# Patient Record
Sex: FEMALE | Race: BLACK OR AFRICAN AMERICAN | NOT HISPANIC OR LATINO
[De-identification: names, ages, dates, MRNs, and addresses within clinical notes are randomized per-mention and may not be internally consistent; named-entity substitution may affect disease eponyms.]

---

## 2017-01-05 ENCOUNTER — APPOINTMENT (OUTPATIENT)
Dept: OBGYN | Facility: CLINIC | Age: 22
End: 2017-01-05

## 2017-01-05 VITALS
HEIGHT: 63 IN | SYSTOLIC BLOOD PRESSURE: 112 MMHG | BODY MASS INDEX: 26.05 KG/M2 | WEIGHT: 147 LBS | DIASTOLIC BLOOD PRESSURE: 78 MMHG

## 2017-01-06 ENCOUNTER — EMERGENCY (EMERGENCY)
Facility: HOSPITAL | Age: 22
LOS: 1 days | Discharge: STILL ADMITTED OR ADMINISTRATIVE | End: 2017-01-06
Admitting: EMERGENCY MEDICINE

## 2017-01-06 ENCOUNTER — OUTPATIENT (OUTPATIENT)
Dept: OUTPATIENT SERVICES | Facility: HOSPITAL | Age: 22
LOS: 1 days | End: 2017-01-06
Payer: COMMERCIAL

## 2017-01-06 VITALS
HEART RATE: 111 BPM | OXYGEN SATURATION: 100 % | DIASTOLIC BLOOD PRESSURE: 84 MMHG | RESPIRATION RATE: 16 BRPM | SYSTOLIC BLOOD PRESSURE: 116 MMHG | TEMPERATURE: 98 F

## 2017-01-06 DIAGNOSIS — O26.90 PREGNANCY RELATED CONDITIONS, UNSPECIFIED, UNSPECIFIED TRIMESTER: ICD-10-CM

## 2017-01-06 PROCEDURE — 99213 OFFICE O/P EST LOW 20 MIN: CPT | Mod: 25

## 2017-01-06 PROCEDURE — 76815 OB US LIMITED FETUS(S): CPT | Mod: 26

## 2017-01-06 NOTE — ED ADULT TRIAGE NOTE - CHIEF COMPLAINT QUOTE
pt 37 weeks pregnant, NEELA 1/29/17, having contractions every 15 min since 4am with one episode vomiting. denies rupture of membranes.   L&D LUCY Caputo called, pt to be brought directly to L&D

## 2017-01-12 ENCOUNTER — APPOINTMENT (OUTPATIENT)
Dept: OBGYN | Facility: CLINIC | Age: 22
End: 2017-01-12

## 2017-01-12 VITALS
SYSTOLIC BLOOD PRESSURE: 119 MMHG | BODY MASS INDEX: 26.78 KG/M2 | HEIGHT: 63 IN | WEIGHT: 151.13 LBS | DIASTOLIC BLOOD PRESSURE: 74 MMHG

## 2017-01-17 ENCOUNTER — APPOINTMENT (OUTPATIENT)
Dept: ANTEPARTUM | Facility: CLINIC | Age: 22
End: 2017-01-17

## 2017-01-17 ENCOUNTER — ASOB RESULT (OUTPATIENT)
Age: 22
End: 2017-01-17

## 2017-01-20 ENCOUNTER — APPOINTMENT (OUTPATIENT)
Dept: OBGYN | Facility: CLINIC | Age: 22
End: 2017-01-20

## 2017-01-20 VITALS
HEIGHT: 63 IN | SYSTOLIC BLOOD PRESSURE: 106 MMHG | WEIGHT: 150 LBS | DIASTOLIC BLOOD PRESSURE: 68 MMHG | BODY MASS INDEX: 26.58 KG/M2

## 2017-01-24 ENCOUNTER — INPATIENT (INPATIENT)
Facility: HOSPITAL | Age: 22
LOS: 1 days | Discharge: HOME | End: 2017-01-26
Attending: OBSTETRICS & GYNECOLOGY | Admitting: OBSTETRICS & GYNECOLOGY
Payer: COMMERCIAL

## 2017-01-24 VITALS — WEIGHT: 149.91 LBS | HEIGHT: 63 IN

## 2017-01-24 DIAGNOSIS — O26.90 PREGNANCY RELATED CONDITIONS, UNSPECIFIED, UNSPECIFIED TRIMESTER: ICD-10-CM

## 2017-01-24 LAB
BASOPHILS # BLD AUTO: 0.01 K/UL — SIGNIFICANT CHANGE UP (ref 0–0.2)
BASOPHILS NFR BLD AUTO: 0.1 % — SIGNIFICANT CHANGE UP (ref 0–2)
BLD GP AB SCN SERPL QL: NEGATIVE — SIGNIFICANT CHANGE UP
EOSINOPHIL # BLD AUTO: 0.1 K/UL — SIGNIFICANT CHANGE UP (ref 0–0.5)
EOSINOPHIL NFR BLD AUTO: 0.9 % — SIGNIFICANT CHANGE UP (ref 0–6)
HCT VFR BLD CALC: 30.1 % — LOW (ref 34.5–45)
HGB BLD-MCNC: 9.3 G/DL — LOW (ref 11.5–15.5)
IMM GRANULOCYTES NFR BLD AUTO: 1 % — SIGNIFICANT CHANGE UP (ref 0–1.5)
LYMPHOCYTES # BLD AUTO: 1.39 K/UL — SIGNIFICANT CHANGE UP (ref 1–3.3)
LYMPHOCYTES # BLD AUTO: 12.5 % — LOW (ref 13–44)
MCHC RBC-ENTMCNC: 23.5 PG — LOW (ref 27–34)
MCHC RBC-ENTMCNC: 30.9 % — LOW (ref 32–36)
MCV RBC AUTO: 76.2 FL — LOW (ref 80–100)
MONOCYTES # BLD AUTO: 0.66 K/UL — SIGNIFICANT CHANGE UP (ref 0–0.9)
MONOCYTES NFR BLD AUTO: 6 % — SIGNIFICANT CHANGE UP (ref 2–14)
NEUTROPHILS # BLD AUTO: 8.81 K/UL — HIGH (ref 1.8–7.4)
NEUTROPHILS NFR BLD AUTO: 79.5 % — HIGH (ref 43–77)
PLATELET # BLD AUTO: 292 K/UL — SIGNIFICANT CHANGE UP (ref 150–400)
PMV BLD: 11.6 FL — SIGNIFICANT CHANGE UP (ref 7–13)
RBC # BLD: 3.95 M/UL — SIGNIFICANT CHANGE UP (ref 3.8–5.2)
RBC # FLD: 15.3 % — HIGH (ref 10.3–14.5)
RH IG SCN BLD-IMP: POSITIVE — SIGNIFICANT CHANGE UP
RH IG SCN BLD-IMP: POSITIVE — SIGNIFICANT CHANGE UP
T PALLIDUM AB TITR SER: NEGATIVE — SIGNIFICANT CHANGE UP
WBC # BLD: 11.08 K/UL — HIGH (ref 3.8–10.5)
WBC # FLD AUTO: 11.08 K/UL — HIGH (ref 3.8–10.5)

## 2017-01-24 PROCEDURE — 59400 OBSTETRICAL CARE: CPT | Mod: GC

## 2017-01-24 RX ORDER — SODIUM CHLORIDE 9 MG/ML
3 INJECTION INTRAMUSCULAR; INTRAVENOUS; SUBCUTANEOUS EVERY 8 HOURS
Qty: 0 | Refills: 0 | Status: DISCONTINUED | OUTPATIENT
Start: 2017-01-24 | End: 2017-01-24

## 2017-01-24 RX ORDER — IBUPROFEN 200 MG
600 TABLET ORAL EVERY 6 HOURS
Qty: 0 | Refills: 0 | Status: DISCONTINUED | OUTPATIENT
Start: 2017-01-24 | End: 2017-01-26

## 2017-01-24 RX ORDER — AER TRAVELER 0.5 G/1
1 SOLUTION RECTAL; TOPICAL EVERY 4 HOURS
Qty: 0 | Refills: 0 | Status: DISCONTINUED | OUTPATIENT
Start: 2017-01-24 | End: 2017-01-24

## 2017-01-24 RX ORDER — HYDROCORTISONE 1 %
1 OINTMENT (GRAM) TOPICAL EVERY 4 HOURS
Qty: 0 | Refills: 0 | Status: DISCONTINUED | OUTPATIENT
Start: 2017-01-24 | End: 2017-01-26

## 2017-01-24 RX ORDER — KETOROLAC TROMETHAMINE 30 MG/ML
30 SYRINGE (ML) INJECTION ONCE
Qty: 0 | Refills: 0 | Status: DISCONTINUED | OUTPATIENT
Start: 2017-01-24 | End: 2017-01-24

## 2017-01-24 RX ORDER — MAGNESIUM HYDROXIDE 400 MG/1
30 TABLET, CHEWABLE ORAL
Qty: 0 | Refills: 0 | Status: DISCONTINUED | OUTPATIENT
Start: 2017-01-24 | End: 2017-01-26

## 2017-01-24 RX ORDER — ACETAMINOPHEN 500 MG
975 TABLET ORAL EVERY 6 HOURS
Qty: 0 | Refills: 0 | Status: DISCONTINUED | OUTPATIENT
Start: 2017-01-24 | End: 2017-01-25

## 2017-01-24 RX ORDER — OXYTOCIN 10 UNIT/ML
41.67 VIAL (ML) INJECTION
Qty: 20 | Refills: 0 | Status: DISCONTINUED | OUTPATIENT
Start: 2017-01-24 | End: 2017-01-24

## 2017-01-24 RX ORDER — LANOLIN
1 OINTMENT (GRAM) TOPICAL EVERY 6 HOURS
Qty: 0 | Refills: 0 | Status: DISCONTINUED | OUTPATIENT
Start: 2017-01-24 | End: 2017-01-26

## 2017-01-24 RX ORDER — CITRIC ACID/SODIUM CITRATE 300-500 MG
15 SOLUTION, ORAL ORAL EVERY 4 HOURS
Qty: 0 | Refills: 0 | Status: DISCONTINUED | OUTPATIENT
Start: 2017-01-24 | End: 2017-01-24

## 2017-01-24 RX ORDER — OXYCODONE HYDROCHLORIDE 5 MG/1
5 TABLET ORAL EVERY 4 HOURS
Qty: 0 | Refills: 0 | Status: DISCONTINUED | OUTPATIENT
Start: 2017-01-24 | End: 2017-01-26

## 2017-01-24 RX ORDER — SODIUM CHLORIDE 9 MG/ML
1000 INJECTION, SOLUTION INTRAVENOUS ONCE
Qty: 0 | Refills: 0 | Status: DISCONTINUED | OUTPATIENT
Start: 2017-01-24 | End: 2017-01-24

## 2017-01-24 RX ORDER — HYDROCORTISONE 1 %
1 OINTMENT (GRAM) TOPICAL EVERY 4 HOURS
Qty: 0 | Refills: 0 | Status: DISCONTINUED | OUTPATIENT
Start: 2017-01-24 | End: 2017-01-24

## 2017-01-24 RX ORDER — DIBUCAINE 1 %
1 OINTMENT (GRAM) RECTAL EVERY 4 HOURS
Qty: 0 | Refills: 0 | Status: DISCONTINUED | OUTPATIENT
Start: 2017-01-24 | End: 2017-01-26

## 2017-01-24 RX ORDER — PRAMOXINE HYDROCHLORIDE 150 MG/15G
1 AEROSOL, FOAM RECTAL EVERY 4 HOURS
Qty: 0 | Refills: 0 | Status: DISCONTINUED | OUTPATIENT
Start: 2017-01-24 | End: 2017-01-24

## 2017-01-24 RX ORDER — IBUPROFEN 200 MG
600 TABLET ORAL EVERY 6 HOURS
Qty: 0 | Refills: 0 | Status: DISCONTINUED | OUTPATIENT
Start: 2017-01-24 | End: 2017-01-24

## 2017-01-24 RX ORDER — DIBUCAINE 1 %
1 OINTMENT (GRAM) RECTAL EVERY 4 HOURS
Qty: 0 | Refills: 0 | Status: DISCONTINUED | OUTPATIENT
Start: 2017-01-24 | End: 2017-01-24

## 2017-01-24 RX ORDER — IBUPROFEN 200 MG
1 TABLET ORAL
Qty: 0 | Refills: 0 | DISCHARGE
Start: 2017-01-24

## 2017-01-24 RX ORDER — DIPHENHYDRAMINE HCL 50 MG
25 CAPSULE ORAL EVERY 6 HOURS
Qty: 0 | Refills: 0 | Status: DISCONTINUED | OUTPATIENT
Start: 2017-01-24 | End: 2017-01-26

## 2017-01-24 RX ORDER — OXYTOCIN 10 UNIT/ML
1 VIAL (ML) INJECTION
Qty: 30 | Refills: 0 | Status: DISCONTINUED | OUTPATIENT
Start: 2017-01-24 | End: 2017-01-24

## 2017-01-24 RX ORDER — OXYTOCIN 10 UNIT/ML
333.3 VIAL (ML) INJECTION
Qty: 20 | Refills: 0 | Status: COMPLETED | OUTPATIENT
Start: 2017-01-24 | End: 2017-01-24

## 2017-01-24 RX ORDER — PRAMOXINE HYDROCHLORIDE 150 MG/15G
1 AEROSOL, FOAM RECTAL EVERY 4 HOURS
Qty: 0 | Refills: 0 | Status: DISCONTINUED | OUTPATIENT
Start: 2017-01-24 | End: 2017-01-26

## 2017-01-24 RX ORDER — OXYTOCIN 10 UNIT/ML
333.3 VIAL (ML) INJECTION
Qty: 20 | Refills: 0 | Status: COMPLETED | OUTPATIENT
Start: 2017-01-24

## 2017-01-24 RX ORDER — SODIUM CHLORIDE 9 MG/ML
3 INJECTION INTRAMUSCULAR; INTRAVENOUS; SUBCUTANEOUS EVERY 8 HOURS
Qty: 0 | Refills: 0 | Status: DISCONTINUED | OUTPATIENT
Start: 2017-01-24 | End: 2017-01-25

## 2017-01-24 RX ORDER — DOCUSATE SODIUM 100 MG
100 CAPSULE ORAL
Qty: 0 | Refills: 0 | Status: DISCONTINUED | OUTPATIENT
Start: 2017-01-24 | End: 2017-01-26

## 2017-01-24 RX ORDER — SODIUM CHLORIDE 9 MG/ML
1000 INJECTION, SOLUTION INTRAVENOUS
Qty: 0 | Refills: 0 | Status: DISCONTINUED | OUTPATIENT
Start: 2017-01-24 | End: 2017-01-24

## 2017-01-24 RX ORDER — OXYCODONE HYDROCHLORIDE 5 MG/1
5 TABLET ORAL
Qty: 0 | Refills: 0 | Status: DISCONTINUED | OUTPATIENT
Start: 2017-01-24 | End: 2017-01-26

## 2017-01-24 RX ORDER — GLYCERIN ADULT
1 SUPPOSITORY, RECTAL RECTAL AT BEDTIME
Qty: 0 | Refills: 0 | Status: DISCONTINUED | OUTPATIENT
Start: 2017-01-24 | End: 2017-01-26

## 2017-01-24 RX ORDER — SIMETHICONE 80 MG/1
80 TABLET, CHEWABLE ORAL EVERY 6 HOURS
Qty: 0 | Refills: 0 | Status: DISCONTINUED | OUTPATIENT
Start: 2017-01-24 | End: 2017-01-26

## 2017-01-24 RX ORDER — TETANUS TOXOID, REDUCED DIPHTHERIA TOXOID AND ACELLULAR PERTUSSIS VACCINE, ADSORBED 5; 2.5; 8; 8; 2.5 [IU]/.5ML; [IU]/.5ML; UG/.5ML; UG/.5ML; UG/.5ML
0.5 SUSPENSION INTRAMUSCULAR ONCE
Qty: 0 | Refills: 0 | Status: DISCONTINUED | OUTPATIENT
Start: 2017-01-24 | End: 2017-01-26

## 2017-01-24 RX ADMIN — Medication 30 MILLIGRAM(S): at 14:49

## 2017-01-24 RX ADMIN — Medication 30 MILLIGRAM(S): at 14:18

## 2017-01-24 RX ADMIN — Medication 125 MILLIUNIT(S)/MIN: at 14:49

## 2017-01-24 RX ADMIN — Medication 999.9 MILLIUNIT(S)/MIN: at 13:48

## 2017-01-24 RX ADMIN — Medication 1 MILLIUNIT(S)/MIN: at 10:11

## 2017-01-24 NOTE — DISCHARGE NOTE OB - MEDICATION SUMMARY - MEDICATIONS TO TAKE
I will START or STAY ON the medications listed below when I get home from the hospital:    ibuprofen 600 mg oral tablet  -- 1 tab(s) by mouth every 6 hours  -- Indication: For Pregnancy related condition

## 2017-01-24 NOTE — DISCHARGE NOTE OB - PATIENT PORTAL LINK FT
“You can access the Follow[facility] Patient Portal, offered by [facility], by registering with the following website: [email or web address]”

## 2017-01-24 NOTE — DISCHARGE NOTE OB - MATERIALS PROVIDED
[facility]  Screening Program/Breastfeeding Log/Guide to Postpartum Care/[place]  Immunization Record/Breastfeeding Guide and Packet/Birth Certificate Instructions/MMR Vaccination (VIS Pub Date: 2012)/Vaccinations/Shaken Baby Prevention Handout/Breastfeeding Mother’s Support Group Information/[facility] Hearing Screen Program/Back To Sleep Handout/Tdap Vaccination (VIS Pub Date: 2012)

## 2017-01-24 NOTE — DISCHARGE NOTE OB - CARE PLAN
Principal Discharge DX:	Vaginal delivery  Goal:	routine postpartum  Instructions for follow-up, activity and diet:	pelvic rest x 6 weeks

## 2017-01-24 NOTE — DISCHARGE NOTE OB - CARE PROVIDERS DIRECT ADDRESSES
[email or web address]

## 2017-01-24 NOTE — DISCHARGE NOTE OB - MEDICATION SUMMARY - MEDICATIONS TO STOP TAKING
I will STOP taking the medications listed below when I get home from the hospital:    phenazopyridine 100 mg oral tablet  -- 1 tab(s) by mouth once a day for 2 days  -- May discolor urine or feces.  Medication should be taken with plenty of water.  Take with food or milk.    phenazopyridine 100 mg oral tablet  -- 1 tab(s) by mouth 2 times a day (after meals)  -- May discolor urine or feces.  Medication should be taken with plenty of water.  Take with food or milk.    sulfamethoxazole-trimethoprim 200 mg-40 mg/5 mL oral suspension  -- 20 milliliter(s) by mouth 2 times a day for 3 days  -- Avoid prolonged or excessive exposure to direct and/or artificial sunlight while taking this medication.  Finish all this medication unless otherwise directed by prescriber.  Medication should be taken with plenty of water.  Shake well before use.

## 2017-01-24 NOTE — DISCHARGE NOTE OB - CARE PROVIDER_API CALL
Nasima Stewart (DO), Obstetrics and Gynecology  [address] [place] [address]  Phone: [phone]  Fax: [phone]

## 2017-01-25 RX ORDER — ACETAMINOPHEN 500 MG
975 TABLET ORAL EVERY 6 HOURS
Qty: 0 | Refills: 0 | Status: DISCONTINUED | OUTPATIENT
Start: 2017-01-25 | End: 2017-01-26

## 2017-01-25 RX ADMIN — Medication 600 MILLIGRAM(S): at 18:22

## 2017-01-25 RX ADMIN — Medication 600 MILLIGRAM(S): at 01:15

## 2017-01-25 RX ADMIN — Medication 600 MILLIGRAM(S): at 00:15

## 2017-01-25 RX ADMIN — Medication 600 MILLIGRAM(S): at 18:15

## 2017-01-25 RX ADMIN — Medication 600 MILLIGRAM(S): at 12:41

## 2017-01-25 RX ADMIN — Medication 600 MILLIGRAM(S): at 12:15

## 2017-01-25 RX ADMIN — Medication 600 MILLIGRAM(S): at 07:00

## 2017-01-25 RX ADMIN — Medication 975 MILLIGRAM(S): at 23:21

## 2017-01-25 RX ADMIN — Medication 1 TABLET(S): at 12:42

## 2017-01-25 RX ADMIN — Medication 600 MILLIGRAM(S): at 06:15

## 2017-01-25 NOTE — LACTATION INITIAL EVALUATION - INTERVENTION OUTCOME
good return demonstration/Worked with mother to try to latch  and  trying to latch and not 24 hours and shown mother hand expression and colostrum noted and mother educated on feeding cues and to breastfeed 8-12 times in 24 hours and discussed and shown how to hand express and can give 5-10cc of colostrum every 2-3 hours and discussed breastfeeding log and assist prn, RN aware of plan/demonstrates understanding of teaching/verbalizes understanding

## 2017-01-26 ENCOUNTER — APPOINTMENT (OUTPATIENT)
Dept: OBGYN | Facility: CLINIC | Age: 22
End: 2017-01-26

## 2017-01-26 VITALS
TEMPERATURE: 98 F | OXYGEN SATURATION: 100 % | SYSTOLIC BLOOD PRESSURE: 109 MMHG | RESPIRATION RATE: 18 BRPM | DIASTOLIC BLOOD PRESSURE: 52 MMHG | HEART RATE: 79 BPM

## 2017-01-26 RX ADMIN — Medication 600 MILLIGRAM(S): at 08:50

## 2017-01-26 RX ADMIN — Medication 975 MILLIGRAM(S): at 06:50

## 2017-01-26 RX ADMIN — Medication 975 MILLIGRAM(S): at 05:52

## 2017-01-26 RX ADMIN — Medication 975 MILLIGRAM(S): at 00:20

## 2017-01-26 RX ADMIN — Medication 600 MILLIGRAM(S): at 02:40

## 2017-01-26 RX ADMIN — Medication 600 MILLIGRAM(S): at 01:48

## 2017-01-26 RX ADMIN — Medication 100 MILLIGRAM(S): at 08:50

## 2017-02-02 ENCOUNTER — APPOINTMENT (OUTPATIENT)
Dept: OBGYN | Facility: CLINIC | Age: 22
End: 2017-02-02

## 2017-03-08 ENCOUNTER — OTHER (OUTPATIENT)
Age: 22
End: 2017-03-08

## 2017-03-08 ENCOUNTER — APPOINTMENT (OUTPATIENT)
Dept: OBGYN | Facility: CLINIC | Age: 22
End: 2017-03-08

## 2017-03-08 VITALS
SYSTOLIC BLOOD PRESSURE: 116 MMHG | BODY MASS INDEX: 23.04 KG/M2 | HEIGHT: 63 IN | WEIGHT: 130 LBS | HEART RATE: 64 BPM | DIASTOLIC BLOOD PRESSURE: 77 MMHG

## 2017-03-08 DIAGNOSIS — R73.09 OTHER ABNORMAL GLUCOSE: ICD-10-CM

## 2017-03-08 DIAGNOSIS — Z34.01 ENCOUNTER FOR SUPERVISION OF NORMAL FIRST PREGNANCY, FIRST TRIMESTER: ICD-10-CM

## 2017-03-08 DIAGNOSIS — Z32.01 ENCOUNTER FOR PREGNANCY TEST, RESULT POSITIVE: ICD-10-CM

## 2017-03-08 DIAGNOSIS — Z34.02 ENCOUNTER FOR SUPERVISION OF NORMAL FIRST PREGNANCY, SECOND TRIMESTER: ICD-10-CM

## 2017-03-08 DIAGNOSIS — Z34.03 ENCOUNTER FOR SUPERVISION OF NORMAL FIRST PREGNANCY, THIRD TRIMESTER: ICD-10-CM

## 2017-03-09 ENCOUNTER — APPOINTMENT (OUTPATIENT)
Dept: INTERNAL MEDICINE | Facility: CLINIC | Age: 22
End: 2017-03-09

## 2017-03-09 ENCOUNTER — OTHER (OUTPATIENT)
Age: 22
End: 2017-03-09

## 2017-03-09 VITALS — DIASTOLIC BLOOD PRESSURE: 76 MMHG | TEMPERATURE: 98.9 F | HEART RATE: 80 BPM | SYSTOLIC BLOOD PRESSURE: 110 MMHG

## 2017-03-09 VITALS — WEIGHT: 128 LBS | BODY MASS INDEX: 22.68 KG/M2 | HEIGHT: 63 IN

## 2017-03-09 DIAGNOSIS — Z86.69 PERSONAL HISTORY OF OTHER DISEASES OF THE NERVOUS SYSTEM AND SENSE ORGANS: ICD-10-CM

## 2017-03-09 LAB
C TRACH RRNA SPEC QL NAA+PROBE: NORMAL
N GONORRHOEA RRNA SPEC QL NAA+PROBE: NORMAL
SOURCE AMPLIFICATION: NORMAL

## 2017-03-14 ENCOUNTER — APPOINTMENT (OUTPATIENT)
Dept: DERMATOLOGY | Facility: CLINIC | Age: 22
End: 2017-03-14

## 2017-03-31 ENCOUNTER — APPOINTMENT (OUTPATIENT)
Dept: ORTHOPEDIC SURGERY | Facility: CLINIC | Age: 22
End: 2017-03-31

## 2017-04-11 ENCOUNTER — APPOINTMENT (OUTPATIENT)
Dept: OBGYN | Facility: CLINIC | Age: 22
End: 2017-04-11

## 2017-07-18 LAB
GP B STREP DNA SPEC QL NAA+PROBE: NORMAL
GP B STREP DNA SPEC QL NAA+PROBE: NOT DETECTED
SOURCE GBS: NORMAL

## 2017-09-11 ENCOUNTER — APPOINTMENT (OUTPATIENT)
Dept: PEDIATRIC ALLERGY IMMUNOLOGY | Facility: CLINIC | Age: 22
End: 2017-09-11

## 2017-11-16 ENCOUNTER — APPOINTMENT (OUTPATIENT)
Dept: INTERNAL MEDICINE | Facility: CLINIC | Age: 22
End: 2017-11-16
Payer: COMMERCIAL

## 2017-11-16 VITALS — HEIGHT: 63 IN | BODY MASS INDEX: 24.98 KG/M2 | WEIGHT: 141 LBS

## 2017-11-16 VITALS
RESPIRATION RATE: 16 BRPM | DIASTOLIC BLOOD PRESSURE: 80 MMHG | HEART RATE: 100 BPM | SYSTOLIC BLOOD PRESSURE: 118 MMHG | TEMPERATURE: 98.9 F

## 2017-11-16 PROCEDURE — 90688 IIV4 VACCINE SPLT 0.5 ML IM: CPT

## 2017-11-16 PROCEDURE — 99213 OFFICE O/P EST LOW 20 MIN: CPT | Mod: 25

## 2017-11-16 PROCEDURE — G0008: CPT

## 2017-11-16 RX ORDER — AZITHROMYCIN 250 MG/1
250 TABLET, FILM COATED ORAL
Qty: 6 | Refills: 0 | Status: DISCONTINUED | COMMUNITY
Start: 2017-09-27

## 2017-11-17 ENCOUNTER — APPOINTMENT (OUTPATIENT)
Dept: INTERNAL MEDICINE | Facility: CLINIC | Age: 22
End: 2017-11-17

## 2017-12-17 ENCOUNTER — EMERGENCY (EMERGENCY)
Facility: HOSPITAL | Age: 22
LOS: 1 days | Discharge: HOME | End: 2017-12-17
Attending: EMERGENCY MEDICINE | Admitting: EMERGENCY MEDICINE
Payer: COMMERCIAL

## 2017-12-17 VITALS
WEIGHT: 143.08 LBS | SYSTOLIC BLOOD PRESSURE: 122 MMHG | HEART RATE: 97 BPM | DIASTOLIC BLOOD PRESSURE: 82 MMHG | TEMPERATURE: 99 F | HEIGHT: 63 IN | OXYGEN SATURATION: 100 % | RESPIRATION RATE: 16 BRPM

## 2017-12-17 PROCEDURE — 99283 EMERGENCY DEPT VISIT LOW MDM: CPT

## 2017-12-17 RX ORDER — PENICILLIN G BENZATHINE 1200000 [IU]/2ML
1.2 INJECTION, SUSPENSION INTRAMUSCULAR ONCE
Qty: 0 | Refills: 0 | Status: COMPLETED | OUTPATIENT
Start: 2017-12-17 | End: 2017-12-17

## 2017-12-17 RX ORDER — PENICILLIN G BENZATHINE 1200000 [IU]/2ML
1.2 INJECTION, SUSPENSION INTRAMUSCULAR ONCE
Qty: 0 | Refills: 0 | Status: DISCONTINUED | OUTPATIENT
Start: 2017-12-17 | End: 2017-12-17

## 2017-12-17 RX ADMIN — PENICILLIN G BENZATHINE 1.2 MILLION UNIT(S): 1200000 INJECTION, SUSPENSION INTRAMUSCULAR at 21:16

## 2017-12-17 RX ADMIN — Medication 40 MILLIGRAM(S): at 21:17

## 2017-12-17 NOTE — ED PROVIDER NOTE - OBJECTIVE STATEMENT
22F p/w cough and sore throat x1 week. cough with whitish phlegm. Patient was treated for strep throat 2 weeks ago with amoxicillin x10 days. felt better for a few days and then sore throat restarted. denies fever, chills, HA, neck pain.

## 2017-12-17 NOTE — ED PROVIDER NOTE - ATTENDING CONTRIBUTION TO CARE
agree with resident note  22 yr old female with 10 days of sore throat with productive sputum.  Was seen in C and diagnosed with strep and given amox for 10 days which made her feel better but states sore throat has returned.    PE: well appearing; VSS: afebrile; no stridor, handling secretions; no tonsillar exudates; CTAB/L    will treat empirically again for strep

## 2017-12-17 NOTE — ED ADULT TRIAGE NOTE - CHIEF COMPLAINT QUOTE
c/o unproductive cough and sore throat x 1.5 weeks. Pt was dx with strept throat 3 weeks ago , finished antibiotic regimen x 10 days.

## 2018-01-12 ENCOUNTER — APPOINTMENT (OUTPATIENT)
Dept: DERMATOLOGY | Facility: CLINIC | Age: 23
End: 2018-01-12

## 2018-01-18 ENCOUNTER — APPOINTMENT (OUTPATIENT)
Dept: OBGYN | Facility: CLINIC | Age: 23
End: 2018-01-18
Payer: COMMERCIAL

## 2018-01-18 VITALS
BODY MASS INDEX: 25.75 KG/M2 | DIASTOLIC BLOOD PRESSURE: 86 MMHG | HEIGHT: 63 IN | HEART RATE: 89 BPM | SYSTOLIC BLOOD PRESSURE: 127 MMHG | WEIGHT: 145.3 LBS

## 2018-01-18 DIAGNOSIS — Z30.09 ENCOUNTER FOR OTHER GENERAL COUNSELING AND ADVICE ON CONTRACEPTION: ICD-10-CM

## 2018-01-18 PROCEDURE — 99213 OFFICE O/P EST LOW 20 MIN: CPT

## 2018-01-19 ENCOUNTER — OTHER (OUTPATIENT)
Age: 23
End: 2018-01-19

## 2018-02-06 ENCOUNTER — APPOINTMENT (OUTPATIENT)
Dept: OTOLARYNGOLOGY | Facility: CLINIC | Age: 23
End: 2018-02-06

## 2018-03-08 ENCOUNTER — APPOINTMENT (OUTPATIENT)
Dept: OBGYN | Facility: CLINIC | Age: 23
End: 2018-03-08

## 2018-04-03 ENCOUNTER — MEDICATION RENEWAL (OUTPATIENT)
Age: 23
End: 2018-04-03

## 2018-04-14 ENCOUNTER — EMERGENCY (EMERGENCY)
Facility: HOSPITAL | Age: 23
LOS: 1 days | Discharge: HOME | End: 2018-04-14
Attending: EMERGENCY MEDICINE | Admitting: EMERGENCY MEDICINE
Payer: COMMERCIAL

## 2018-04-14 VITALS
SYSTOLIC BLOOD PRESSURE: 122 MMHG | OXYGEN SATURATION: 100 % | TEMPERATURE: 99 F | RESPIRATION RATE: 16 BRPM | DIASTOLIC BLOOD PRESSURE: 76 MMHG | HEART RATE: 81 BPM

## 2018-04-14 PROCEDURE — 73562 X-RAY EXAM OF KNEE 3: CPT | Mod: 26,LT

## 2018-04-14 PROCEDURE — 99283 EMERGENCY DEPT VISIT LOW MDM: CPT

## 2018-04-14 RX ORDER — KETOROLAC TROMETHAMINE 30 MG/ML
30 SYRINGE (ML) INJECTION ONCE
Qty: 0 | Refills: 0 | Status: DISCONTINUED | OUTPATIENT
Start: 2018-04-14 | End: 2018-04-14

## 2018-04-14 RX ADMIN — Medication 30 MILLIGRAM(S): at 11:03

## 2018-04-14 NOTE — ED PROVIDER NOTE - MEDICAL DECISION MAKING DETAILS
23y f no sig PMHx p/w L knee pain after hearing a crack on extension this morning. On exam has some increased pain on extension of her knee, and has some pain with weight bearing.  No obvious deformity of the knee, no swelling visible or palpable edema around joint. Mild decreased ROM on extension of knee; flexion normal. Ant/post drawer test (-), rui test (-). Denies trauma to the knee but states she recently moved and so has been putting a lot of weight on it recently.  Will obtain knee xray, admin pain control. Pt continues to ambulate. Will cont to reassess -Feliciano 23y f no sig PMHx p/w L knee pain after hearing a crack on extension this morning. On exam has some increased pain on extension of her knee, and has some pain with weight bearing.  No obvious deformity of the knee, no swelling visible or palpable edema around joint. Mild decreased ROM on extension of knee; flexion normal. Ant/post drawer test (-), rui test (-). Denies trauma to the knee but states she recently moved and so has been putting a lot of weight on it recently.  Will obtain knee xray, admin pain control. Pt continues to ambulate. Will cont to reassess  Xray showing mild swelling likely due to arthritis, no evidence of dislocation or fracture, will dc w/ knee brace and crutches and ortho f/u -Feliciano

## 2018-04-14 NOTE — ED PROVIDER NOTE - ATTENDING CONTRIBUTION TO CARE
Chin: 23 yof with c/o sudden onset of left knee pain after extending it today.  Pt has had achy pain for few weeks also started new working out and recently moved.  No direct trauma.  On exam, pt is well appearing no distress, +left knee small effusion, +blottable, from with tn, mild tn.  distal exam normal.  Xray, nsaids, ace wrap, elevate, rest, ice.

## 2018-04-14 NOTE — ED PROVIDER NOTE - OBJECTIVE STATEMENT
23y f no sig PMHx p/w L knee pain after hearing a crack on extension this morning. She states her L knee has been achy for the last few weeks but after this morning she has had increased pain on extension of her knee, and has some pain with weight bearing. No obvious deformity of the knee, no swelling visible or palpable edema around joint. Denies trauma to the knee but states she recently moved and so has been putting a lot of weight on it recently.

## 2018-04-14 NOTE — ED PROVIDER NOTE - MUSCULOSKELETAL MINIMAL EXAM
Mild pain on full extension of knee, no pain on flexion/no muscle tenderness/atraumatic/RANGE OF MOTION LIMITED/motor intact

## 2018-04-26 ENCOUNTER — MEDICATION RENEWAL (OUTPATIENT)
Age: 23
End: 2018-04-26

## 2018-05-10 ENCOUNTER — LABORATORY RESULT (OUTPATIENT)
Age: 23
End: 2018-05-10

## 2018-05-10 ENCOUNTER — APPOINTMENT (OUTPATIENT)
Dept: OBGYN | Facility: CLINIC | Age: 23
End: 2018-05-10
Payer: COMMERCIAL

## 2018-05-10 VITALS
DIASTOLIC BLOOD PRESSURE: 81 MMHG | BODY MASS INDEX: 24.98 KG/M2 | WEIGHT: 141 LBS | HEART RATE: 60 BPM | HEIGHT: 63 IN | SYSTOLIC BLOOD PRESSURE: 116 MMHG

## 2018-05-10 DIAGNOSIS — Z83.49 FAMILY HISTORY OF OTHER ENDOCRINE, NUTRITIONAL AND METABOLIC DISEASES: ICD-10-CM

## 2018-05-10 DIAGNOSIS — Z01.419 ENCOUNTER FOR GYNECOLOGICAL EXAMINATION (GENERAL) (ROUTINE) W/OUT ABNORMAL FINDINGS: ICD-10-CM

## 2018-05-10 DIAGNOSIS — Z83.3 FAMILY HISTORY OF DIABETES MELLITUS: ICD-10-CM

## 2018-05-10 PROCEDURE — 99395 PREV VISIT EST AGE 18-39: CPT

## 2018-05-10 RX ORDER — AMOXICILLIN 250 MG/5ML
250 POWDER, FOR SUSPENSION ORAL
Qty: 200 | Refills: 0 | Status: DISCONTINUED | COMMUNITY
Start: 2017-11-28

## 2018-05-10 RX ORDER — AMOXICILLIN 400 MG/5ML
400 FOR SUSPENSION ORAL
Qty: 200 | Refills: 0 | Status: DISCONTINUED | COMMUNITY
Start: 2018-04-24

## 2018-05-11 LAB
C TRACH RRNA SPEC QL NAA+PROBE: NOT DETECTED
N GONORRHOEA RRNA SPEC QL NAA+PROBE: NOT DETECTED
SOURCE AMPLIFICATION: NORMAL

## 2018-05-17 ENCOUNTER — OTHER (OUTPATIENT)
Age: 23
End: 2018-05-17

## 2018-05-17 LAB — CYTOLOGY CVX/VAG DOC THIN PREP: NORMAL

## 2018-06-04 ENCOUNTER — APPOINTMENT (OUTPATIENT)
Dept: INTERNAL MEDICINE | Facility: CLINIC | Age: 23
End: 2018-06-04
Payer: COMMERCIAL

## 2018-06-04 VITALS
RESPIRATION RATE: 20 BRPM | DIASTOLIC BLOOD PRESSURE: 80 MMHG | TEMPERATURE: 98.4 F | SYSTOLIC BLOOD PRESSURE: 114 MMHG | HEART RATE: 68 BPM

## 2018-06-04 VITALS — WEIGHT: 145 LBS

## 2018-06-04 PROCEDURE — 99214 OFFICE O/P EST MOD 30 MIN: CPT

## 2018-06-04 RX ORDER — FLUTICASONE PROPIONATE 50 UG/1
50 SPRAY, METERED NASAL DAILY
Qty: 1 | Refills: 1 | Status: ACTIVE | COMMUNITY
Start: 2018-06-04 | End: 1900-01-01

## 2018-06-04 NOTE — REVIEW OF SYSTEMS
[Nasal Discharge] : nasal discharge [Postnasal Drip] : postnasal drip [Negative] : Heme/Lymph [Earache] : no earache [Hearing Loss] : no hearing loss [Nosebleed] : no nosebleeds [Hoarseness] : no hoarseness [Sore Throat] : no sore throat [Skin Rash] : no skin rash

## 2018-06-04 NOTE — PHYSICAL EXAM
[No Acute Distress] : no acute distress [Well Nourished] : well nourished [Well Developed] : well developed [Well-Appearing] : well-appearing [Normal Sclera/Conjunctiva] : normal sclera/conjunctiva [PERRL] : pupils equal round and reactive to light [EOMI] : extraocular movements intact [Normal Outer Ear/Nose] : the outer ears and nose were normal in appearance [Normal Oropharynx] : the oropharynx was normal [No JVD] : no jugular venous distention [No Lymphadenopathy] : no lymphadenopathy [Thyroid Normal, No Nodules] : the thyroid was normal and there were no nodules present [No Respiratory Distress] : no respiratory distress  [Clear to Auscultation] : lungs were clear to auscultation bilaterally [No Accessory Muscle Use] : no accessory muscle use [Normal Rate] : normal rate  [Regular Rhythm] : with a regular rhythm [Normal S1, S2] : normal S1 and S2 [No Murmur] : no murmur heard [No Carotid Bruits] : no carotid bruits [No Abdominal Bruit] : a ~M bruit was not heard ~T in the abdomen [No Varicosities] : no varicosities [Pedal Pulses Present] : the pedal pulses are present [Soft] : abdomen soft [Non Tender] : non-tender [Non-distended] : non-distended [No Masses] : no abdominal mass palpated [No HSM] : no HSM [Normal Bowel Sounds] : normal bowel sounds [Normal Anterior Cervical Nodes] : no anterior cervical lymphadenopathy [No CVA Tenderness] : no CVA  tenderness [No Spinal Tenderness] : no spinal tenderness [No Joint Swelling] : no joint swelling [Grossly Normal Strength/Tone] : grossly normal strength/tone [No Rash] : no rash [Normal Gait] : normal gait [Coordination Grossly Intact] : coordination grossly intact [No Focal Deficits] : no focal deficits [Speech Grossly Normal] : speech grossly normal [Memory Grossly Normal] : memory grossly normal [Normal Affect] : the affect was normal [Alert and Oriented x3] : oriented to person, place, and time [Normal Mood] : the mood was normal [Normal Insight/Judgement] : insight and judgment were intact [de-identified] : cerumen at the ears bilat

## 2018-06-04 NOTE — HISTORY OF PRESENT ILLNESS
[FreeTextEntry1] : congestion, low back pain, dyspepsia [de-identified] : 24 y/o female with a hx of lbp, dyspepsia here for f/u and c/o congestion.  \par congestion has been at the nose and maxilla.  + occ frontal headaches.  Has been constant and for ~ 11 mo.  Worsening.  + occ clear nasal d/c - occ yellowish.  no releif with otc antihistamines and decongestants.  Reports temp relief with nasal decongestants.  no fevers.  No sx at the throat.  no noted sx at the chest.  + some tingling in the ear for the past few days-  no pain, pressure.  Occ worse with going into cold room.  Reports issues with lying down at night - needs to breathe through the mouth.  No rash or LAD.\par no other noted relieving or exacerbating factors.\par \par back has been improved.  s/p PT - doing stretches and exercises in the gym.\par \par Dyspepsia - has been controlled with diet.  \par \par Saw Gyn last mo - on chart.

## 2018-09-24 ENCOUNTER — OTHER (OUTPATIENT)
Age: 23
End: 2018-09-24

## 2018-12-01 ENCOUNTER — TRANSCRIPTION ENCOUNTER (OUTPATIENT)
Age: 23
End: 2018-12-01

## 2018-12-16 ENCOUNTER — TRANSCRIPTION ENCOUNTER (OUTPATIENT)
Age: 23
End: 2018-12-16

## 2018-12-31 ENCOUNTER — EMERGENCY (EMERGENCY)
Facility: HOSPITAL | Age: 23
LOS: 1 days | Discharge: HOME | End: 2018-12-31
Attending: EMERGENCY MEDICINE | Admitting: EMERGENCY MEDICINE
Payer: COMMERCIAL

## 2018-12-31 VITALS
HEART RATE: 72 BPM | OXYGEN SATURATION: 100 % | DIASTOLIC BLOOD PRESSURE: 61 MMHG | SYSTOLIC BLOOD PRESSURE: 120 MMHG | RESPIRATION RATE: 16 BRPM | TEMPERATURE: 98 F

## 2018-12-31 PROCEDURE — 99283 EMERGENCY DEPT VISIT LOW MDM: CPT

## 2018-12-31 RX ORDER — CIPROFLOXACIN AND DEXAMETHASONE 3; 1 MG/ML; MG/ML
4 SUSPENSION/ DROPS AURICULAR (OTIC)
Qty: 10 | Refills: 0
Start: 2018-12-31 | End: 2019-01-06

## 2018-12-31 NOTE — ED PROVIDER NOTE - MEDICAL DECISION MAKING DETAILS
Cabot: 23F with no PMH who comes in with several days of R ear pain, L trapezius pain, mild HA.  No F/C.  No trouble breathing or swallowing.  Mild erythema of the R ear canal. Will tx for You have been seen for You have been seen for otitis ext.

## 2018-12-31 NOTE — ED PROVIDER NOTE - OBJECTIVE STATEMENT
Cabot: 23F with no PMH who comes in with several days of R ear pain, L trapezius pain, mild HA.  No F/C.  No trouble breathing or swallowing.

## 2018-12-31 NOTE — ED PROVIDER NOTE - NSFOLLOWUPINSTRUCTIONS_ED_ALL_ED_FT
You have been seen for inflammation and infection of the ear canal.  To treat this, you will do the followin) Take motrin 600mg every 8 hours for pain  2) Place 4 ear drops of ciprodex in the right ear twice daily for 7 days    Come back to the ED for fever, inability to open your mouth, or other concerns..

## 2018-12-31 NOTE — ED PROVIDER NOTE - PHYSICAL EXAMINATION
HDS, NAD, MMM, eyes clear,  bilat TMs with mild cerumen, R ear canal mildly erythematous with some pain with ear traction, lungs CTAB, heart sounds normal, abd soft, NT, ND, no CVAT, LEs without edema, wwp, skin normal temperature and color, neuro: alert and oriented, no focal deficits

## 2019-04-05 ENCOUNTER — LABORATORY RESULT (OUTPATIENT)
Age: 24
End: 2019-04-05

## 2019-04-05 ENCOUNTER — APPOINTMENT (OUTPATIENT)
Dept: INTERNAL MEDICINE | Facility: CLINIC | Age: 24
End: 2019-04-05
Payer: COMMERCIAL

## 2019-04-05 VITALS
SYSTOLIC BLOOD PRESSURE: 122 MMHG | WEIGHT: 150 LBS | BODY MASS INDEX: 26.58 KG/M2 | DIASTOLIC BLOOD PRESSURE: 84 MMHG | HEIGHT: 63 IN | HEART RATE: 62 BPM

## 2019-04-05 VITALS — RESPIRATION RATE: 20 BRPM

## 2019-04-05 DIAGNOSIS — J32.9 CHRONIC SINUSITIS, UNSPECIFIED: ICD-10-CM

## 2019-04-05 DIAGNOSIS — R41.840 ATTENTION AND CONCENTRATION DEFICIT: ICD-10-CM

## 2019-04-05 PROCEDURE — 99395 PREV VISIT EST AGE 18-39: CPT | Mod: 25

## 2019-04-05 PROCEDURE — 36415 COLL VENOUS BLD VENIPUNCTURE: CPT

## 2019-04-05 PROCEDURE — 86580 TB INTRADERMAL TEST: CPT

## 2019-04-05 NOTE — HISTORY OF PRESENT ILLNESS
[FreeTextEntry1] : annual PE\par lbp, dyspepsia [de-identified] : 22 y/o female with a hx of lbp, dyspepsia, chr rhinitis\par Saw allergy for the rhinitis - had testing sent.  on rx - ? steroid.  Has f/u appt.  \par back has been improved - controlled.  s/p PT - doing stretches and exercises in the gym.\par Dyspepsia - has been controlled with diet.  \par Repotrs occ issues with concentration.  occasional.  Brother with ADHD\par On amoxil for wisdom teeth - impacted\par \par Saw Gyn 5/18\par \par needs PPD.\par Reports that she is up to date with vaccines - had with pediatrician and with employee health

## 2019-04-05 NOTE — ASSESSMENT
[FreeTextEntry1] : Discussed with the patient health care maintenance.  \par Discussed age and condition appropriate vaccinations.  \par Discussed age appropriate cancer screening testing as indicated including colon cancer screening/colonoscopy, cervical cancer screening/PAP testing, breast cancer screening/mammogram and skin cancer screening.  \par Discussed protection from the sun.  \par Discussed healthy diet, exercise and weight control for health.\par Discussed healthy habits including abstaining from smoking and drugs and abstention/moderation with alcohol.\par Discussed routine regular ophthalmology and dental follow up.\par Routine labs discussed with the patient and sent.

## 2019-04-05 NOTE — REVIEW OF SYSTEMS
[Negative] : Heme/Lymph [Itching] : no itching [Skin Rash] : no skin rash [de-identified] : concentration issues

## 2019-04-05 NOTE — HEALTH RISK ASSESSMENT
[No falls in past year] : Patient reported no falls in the past year [0] : 2) Feeling down, depressed, or hopeless: Not at all (0) [None] : None [With Significant Other] : lives with significant other [With Family] : lives with family [Employed] : employed [Student] : student [Significant Other] : lives with significant other [Sexually Active] : sexually active [Feels Safe at Home] : Feels safe at home [Fully functional (bathing, dressing, toileting, transferring, walking, feeding)] : Fully functional (bathing, dressing, toileting, transferring, walking, feeding) [Fully functional (using the telephone, shopping, preparing meals, housekeeping, doing laundry, using] : Fully functional and needs no help or supervision to perform IADLs (using the telephone, shopping, preparing meals, housekeeping, doing laundry, using transportation, managing medications and managing finances) [Smoke Detector] : smoke detector [Carbon Monoxide Detector] : carbon monoxide detector [Seat Belt] :  uses seat belt [Sunscreen] : uses sunscreen [] : No [Change in mental status noted] : No change in mental status noted [Reports changes in hearing] : Reports no changes in hearing [Reports changes in vision] : Reports no changes in vision [Reports changes in dental health] : Reports no changes in dental health [de-identified] : as noted in HPI - occ concentration lapse

## 2019-04-08 ENCOUNTER — APPOINTMENT (OUTPATIENT)
Dept: INTERNAL MEDICINE | Facility: CLINIC | Age: 24
End: 2019-04-08
Payer: COMMERCIAL

## 2019-04-08 LAB
25(OH)D3 SERPL-MCNC: 12.4 NG/ML
ALBUMIN SERPL ELPH-MCNC: 4.3 G/DL
ALP BLD-CCNC: 75 U/L
ALT SERPL-CCNC: 23 U/L
ANION GAP SERPL CALC-SCNC: 11 MMOL/L
AST SERPL-CCNC: 25 U/L
BASOPHILS # BLD AUTO: 0.06 K/UL
BASOPHILS NFR BLD AUTO: 0.7 %
BILIRUB SERPL-MCNC: <0.2 MG/DL
BUN SERPL-MCNC: 12 MG/DL
CALCIUM SERPL-MCNC: 9.3 MG/DL
CHLORIDE SERPL-SCNC: 105 MMOL/L
CHOLEST SERPL-MCNC: 170 MG/DL
CHOLEST/HDLC SERPL: 2.9 RATIO
CO2 SERPL-SCNC: 23 MMOL/L
CREAT SERPL-MCNC: 0.78 MG/DL
EOSINOPHIL # BLD AUTO: 0.77 K/UL
EOSINOPHIL NFR BLD AUTO: 9.2 %
ESTIMATED AVERAGE GLUCOSE: 117 MG/DL
GLUCOSE SERPL-MCNC: 85 MG/DL
HBA1C MFR BLD HPLC: 5.7 %
HCT VFR BLD CALC: 39.4 %
HDLC SERPL-MCNC: 58 MG/DL
HGB BLD-MCNC: 11.6 G/DL
IMM GRANULOCYTES NFR BLD AUTO: 0.2 %
IRON SATN MFR SERPL: 20 %
IRON SERPL-MCNC: 69 UG/DL
LDLC SERPL CALC-MCNC: 93 MG/DL
LYMPHOCYTES # BLD AUTO: 3.62 K/UL
LYMPHOCYTES NFR BLD AUTO: 43.4 %
MAN DIFF?: NORMAL
MCHC RBC-ENTMCNC: 26 PG
MCHC RBC-ENTMCNC: 29.4 GM/DL
MCV RBC AUTO: 88.1 FL
MONOCYTES # BLD AUTO: 0.33 K/UL
MONOCYTES NFR BLD AUTO: 4 %
NEUTROPHILS # BLD AUTO: 3.54 K/UL
NEUTROPHILS NFR BLD AUTO: 42.5 %
PLATELET # BLD AUTO: 377 K/UL
POTASSIUM SERPL-SCNC: 4.3 MMOL/L
PROT SERPL-MCNC: 7 G/DL
RBC # BLD: 4.47 M/UL
RBC # FLD: 14.1 %
SODIUM SERPL-SCNC: 139 MMOL/L
TIBC SERPL-MCNC: 349 UG/DL
TRIGL SERPL-MCNC: 96 MG/DL
TSH SERPL-ACNC: 1.33 UIU/ML
UIBC SERPL-MCNC: 280 UG/DL
WBC # FLD AUTO: 8.34 K/UL

## 2019-04-08 PROCEDURE — ZZZZZ: CPT

## 2019-04-10 ENCOUNTER — RX RENEWAL (OUTPATIENT)
Age: 24
End: 2019-04-10

## 2019-04-10 RX ORDER — MULTIVIT-MIN/FOLIC/VIT K/LYCOP 400-300MCG
25 MCG TABLET ORAL DAILY
Qty: 1 | Refills: 5 | Status: ACTIVE | COMMUNITY
Start: 1900-01-01 | End: 1900-01-01

## 2019-04-12 ENCOUNTER — TRANSCRIPTION ENCOUNTER (OUTPATIENT)
Age: 24
End: 2019-04-12

## 2019-05-21 ENCOUNTER — APPOINTMENT (OUTPATIENT)
Dept: OTOLARYNGOLOGY | Facility: CLINIC | Age: 24
End: 2019-05-21
Payer: COMMERCIAL

## 2019-05-21 VITALS
BODY MASS INDEX: 25.52 KG/M2 | HEART RATE: 86 BPM | DIASTOLIC BLOOD PRESSURE: 70 MMHG | HEIGHT: 63 IN | SYSTOLIC BLOOD PRESSURE: 109 MMHG | WEIGHT: 144 LBS

## 2019-05-21 PROCEDURE — 99213 OFFICE O/P EST LOW 20 MIN: CPT | Mod: 25

## 2019-05-21 PROCEDURE — 92504 EAR MICROSCOPY EXAMINATION: CPT

## 2019-05-21 RX ORDER — LEVONORGESTREL AND ETHINYL ESTRADIOL 0.1-0.02MG
0.1-2 KIT ORAL DAILY
Qty: 28 | Refills: 0 | Status: DISCONTINUED | COMMUNITY
Start: 2018-01-18 | End: 2019-05-21

## 2019-05-21 RX ORDER — LEVONORGESTREL AND ETHINYL ESTRADIOL 0.1-0.02MG
0.1-2 KIT ORAL DAILY
Qty: 28 | Refills: 11 | Status: DISCONTINUED | COMMUNITY
Start: 2018-05-10 | End: 2019-05-21

## 2019-05-21 RX ORDER — LEVONORGESTREL AND ETHINYL ESTRADIOL 0.1-0.02MG
0.1-2 KIT ORAL DAILY
Qty: 90 | Refills: 2 | Status: DISCONTINUED | COMMUNITY
Start: 2018-09-24 | End: 2019-05-21

## 2019-05-23 NOTE — PHYSICAL EXAM
[Binocular Microscopic Exam] : Binocular microscopic exam was performed [Hearing Loss Right Only] : normal [Hearing Loss Left Only] : normal [Rinne Test Air Conduction Persists > Bone Conduction Right] : air conduction greater than bone conduction on the right [Rinne Test Air Conduction Persists > Bone Conduction Left] : air conduction greater than bone conduction on the left [Hearing Smith Test (Tuning Fork On Forehead)] : no lateralization of tone [FreeTextEntry8] : cerumen, removed [FreeTextEntry9] : cerumen, removed [Normal] : no rashes

## 2019-05-23 NOTE — HISTORY OF PRESENT ILLNESS
[de-identified] : 24 year old woman former patient of Dr. Zelaya, here for clogged ears.  Last seen Dec. 30, 2014.  Patient denies otalgia, otorrhea, ear infections, hearing loss, tinnitus, dizziness, vertigo, headaches related to hearing.\par

## 2019-05-23 NOTE — REASON FOR VISIT
[Initial Evaluation] : an initial evaluation for [Other: _____] : [unfilled] [FreeTextEntry2] : former patient of Dr. Zelaya, here for clogged ears

## 2019-05-23 NOTE — PROCEDURE
[Cerumen Impaction] : Cerumen Impaction [Same] : same as the Pre Op Dx. [] : Removal of Cerumen [FreeTextEntry1] : ear fullness [FreeTextEntry4] : none [FreeTextEntry6] : Cerumen was removed under binocular microscopy with a combination of a suction, banegas needle, alligator and/or a loop curette. The patient tolerated the procedure well and there were no complications. The included findings were noted.\par

## 2019-05-26 ENCOUNTER — TRANSCRIPTION ENCOUNTER (OUTPATIENT)
Age: 24
End: 2019-05-26

## 2019-05-31 ENCOUNTER — EMERGENCY (EMERGENCY)
Facility: HOSPITAL | Age: 24
LOS: 1 days | Discharge: HOME | End: 2019-05-31
Attending: EMERGENCY MEDICINE | Admitting: EMERGENCY MEDICINE
Payer: COMMERCIAL

## 2019-05-31 VITALS
HEART RATE: 79 BPM | RESPIRATION RATE: 16 BRPM | TEMPERATURE: 99 F | OXYGEN SATURATION: 99 % | DIASTOLIC BLOOD PRESSURE: 87 MMHG | SYSTOLIC BLOOD PRESSURE: 125 MMHG

## 2019-05-31 PROCEDURE — 99283 EMERGENCY DEPT VISIT LOW MDM: CPT

## 2019-05-31 RX ORDER — POLYMYXIN B SULF/TRIMETHOPRIM 10000-1/ML
1 DROPS OPHTHALMIC (EYE) ONCE
Refills: 0 | Status: COMPLETED | OUTPATIENT
Start: 2019-05-31 | End: 2019-05-31

## 2019-05-31 RX ADMIN — Medication 1 DROP(S): at 10:25

## 2019-05-31 NOTE — ED PROVIDER NOTE - CHPI ED SYMPTOMS NEG
no blurred vision/no pain/no foreign body/Denies blurred vision, eye, eye pain, foreign body sensation, purulent drainage./no drainage/no purulent drainage

## 2019-05-31 NOTE — ED PROVIDER NOTE - CLINICAL SUMMARY MEDICAL DECISION MAKING FREE TEXT BOX
23 y/o F with no significant PMHx presents to the ED c/o conjunctivitis today. Plan - Will prescribe abx, discuss hand washing, and reassess.

## 2019-05-31 NOTE — ED ADULT TRIAGE NOTE - CHIEF COMPLAINT QUOTE
Pt. c/o redness and eye discharge x 2 days. States daughter has pink-eye. Has been using her drops with no relief.

## 2019-05-31 NOTE — ED PROVIDER NOTE - OBJECTIVE STATEMENT
23 y/o F with no significant PMHx presents to the ED c/o conjunctivitis today. Pt reports sick contact was her daughter. 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 Denies blurred vision, eye, eye pain, foreign body sensation, purulent drainage. No other acute complaints at time of eval.

## 2019-06-01 ENCOUNTER — TRANSCRIPTION ENCOUNTER (OUTPATIENT)
Age: 24
End: 2019-06-01

## 2019-06-23 ENCOUNTER — EMERGENCY (EMERGENCY)
Facility: HOSPITAL | Age: 24
LOS: 1 days | Discharge: HOME | End: 2019-06-23
Attending: EMERGENCY MEDICINE | Admitting: EMERGENCY MEDICINE
Payer: COMMERCIAL

## 2019-06-23 VITALS
HEART RATE: 86 BPM | DIASTOLIC BLOOD PRESSURE: 62 MMHG | OXYGEN SATURATION: 100 % | RESPIRATION RATE: 16 BRPM | HEIGHT: 63 IN | WEIGHT: 149.91 LBS | SYSTOLIC BLOOD PRESSURE: 113 MMHG | TEMPERATURE: 99 F

## 2019-06-23 LAB
ALBUMIN SERPL ELPH-MCNC: 4 G/DL — SIGNIFICANT CHANGE UP (ref 3.3–5)
ALP SERPL-CCNC: 53 U/L — SIGNIFICANT CHANGE UP (ref 40–120)
ALT FLD-CCNC: 13 U/L — SIGNIFICANT CHANGE UP (ref 4–33)
ANION GAP SERPL CALC-SCNC: 9 MMO/L — SIGNIFICANT CHANGE UP (ref 7–14)
APPEARANCE UR: CLEAR — SIGNIFICANT CHANGE UP
AST SERPL-CCNC: 14 U/L — SIGNIFICANT CHANGE UP (ref 4–32)
BACTERIA # UR AUTO: SIGNIFICANT CHANGE UP
BASOPHILS # BLD AUTO: 0.06 K/UL — SIGNIFICANT CHANGE UP (ref 0–0.2)
BASOPHILS NFR BLD AUTO: 0.5 % — SIGNIFICANT CHANGE UP (ref 0–2)
BILIRUB SERPL-MCNC: < 0.2 MG/DL — LOW (ref 0.2–1.2)
BILIRUB UR-MCNC: NEGATIVE — SIGNIFICANT CHANGE UP
BLOOD UR QL VISUAL: NEGATIVE — SIGNIFICANT CHANGE UP
BUN SERPL-MCNC: 9 MG/DL — SIGNIFICANT CHANGE UP (ref 7–23)
CALCIUM SERPL-MCNC: 9.5 MG/DL — SIGNIFICANT CHANGE UP (ref 8.4–10.5)
CHLORIDE SERPL-SCNC: 104 MMOL/L — SIGNIFICANT CHANGE UP (ref 98–107)
CO2 SERPL-SCNC: 22 MMOL/L — SIGNIFICANT CHANGE UP (ref 22–31)
COLOR SPEC: YELLOW — SIGNIFICANT CHANGE UP
CREAT SERPL-MCNC: 0.62 MG/DL — SIGNIFICANT CHANGE UP (ref 0.5–1.3)
EOSINOPHIL # BLD AUTO: 0.97 K/UL — HIGH (ref 0–0.5)
EOSINOPHIL NFR BLD AUTO: 7.9 % — HIGH (ref 0–6)
GLUCOSE SERPL-MCNC: 79 MG/DL — SIGNIFICANT CHANGE UP (ref 70–99)
GLUCOSE UR-MCNC: NEGATIVE — SIGNIFICANT CHANGE UP
HCG SERPL-ACNC: SIGNIFICANT CHANGE UP MIU/ML
HCT VFR BLD CALC: 34.4 % — LOW (ref 34.5–45)
HGB BLD-MCNC: 10.6 G/DL — LOW (ref 11.5–15.5)
IMM GRANULOCYTES NFR BLD AUTO: 0.4 % — SIGNIFICANT CHANGE UP (ref 0–1.5)
KETONES UR-MCNC: NEGATIVE — SIGNIFICANT CHANGE UP
LEUKOCYTE ESTERASE UR-ACNC: NEGATIVE — SIGNIFICANT CHANGE UP
LYMPHOCYTES # BLD AUTO: 36.6 % — SIGNIFICANT CHANGE UP (ref 13–44)
LYMPHOCYTES # BLD AUTO: 4.48 K/UL — HIGH (ref 1–3.3)
MCHC RBC-ENTMCNC: 26.3 PG — LOW (ref 27–34)
MCHC RBC-ENTMCNC: 30.8 % — LOW (ref 32–36)
MCV RBC AUTO: 85.4 FL — SIGNIFICANT CHANGE UP (ref 80–100)
MONOCYTES # BLD AUTO: 0.82 K/UL — SIGNIFICANT CHANGE UP (ref 0–0.9)
MONOCYTES NFR BLD AUTO: 6.7 % — SIGNIFICANT CHANGE UP (ref 2–14)
NEUTROPHILS # BLD AUTO: 5.87 K/UL — SIGNIFICANT CHANGE UP (ref 1.8–7.4)
NEUTROPHILS NFR BLD AUTO: 47.9 % — SIGNIFICANT CHANGE UP (ref 43–77)
NITRITE UR-MCNC: NEGATIVE — SIGNIFICANT CHANGE UP
NRBC # FLD: 0 K/UL — SIGNIFICANT CHANGE UP (ref 0–0)
PH UR: 6.5 — SIGNIFICANT CHANGE UP (ref 5–8)
PLATELET # BLD AUTO: 328 K/UL — SIGNIFICANT CHANGE UP (ref 150–400)
PMV BLD: 10.6 FL — SIGNIFICANT CHANGE UP (ref 7–13)
POTASSIUM SERPL-MCNC: 3.9 MMOL/L — SIGNIFICANT CHANGE UP (ref 3.5–5.3)
POTASSIUM SERPL-SCNC: 3.9 MMOL/L — SIGNIFICANT CHANGE UP (ref 3.5–5.3)
PROT SERPL-MCNC: 7 G/DL — SIGNIFICANT CHANGE UP (ref 6–8.3)
PROT UR-MCNC: 30 — SIGNIFICANT CHANGE UP
RBC # BLD: 4.03 M/UL — SIGNIFICANT CHANGE UP (ref 3.8–5.2)
RBC # FLD: 14.3 % — SIGNIFICANT CHANGE UP (ref 10.3–14.5)
RBC CASTS # UR COMP ASSIST: SIGNIFICANT CHANGE UP (ref 0–?)
SODIUM SERPL-SCNC: 135 MMOL/L — SIGNIFICANT CHANGE UP (ref 135–145)
SP GR SPEC: 1.03 — SIGNIFICANT CHANGE UP (ref 1–1.04)
SQUAMOUS # UR AUTO: SIGNIFICANT CHANGE UP
UROBILINOGEN FLD QL: NORMAL — SIGNIFICANT CHANGE UP
WBC # BLD: 12.25 K/UL — HIGH (ref 3.8–10.5)
WBC # FLD AUTO: 12.25 K/UL — HIGH (ref 3.8–10.5)
WBC UR QL: SIGNIFICANT CHANGE UP (ref 0–?)

## 2019-06-23 PROCEDURE — 99284 EMERGENCY DEPT VISIT MOD MDM: CPT

## 2019-06-23 RX ORDER — CEPHALEXIN 500 MG
1 CAPSULE ORAL
Qty: 20 | Refills: 0
Start: 2019-06-23 | End: 2019-07-02

## 2019-06-23 RX ORDER — SODIUM CHLORIDE 9 MG/ML
1000 INJECTION INTRAMUSCULAR; INTRAVENOUS; SUBCUTANEOUS ONCE
Refills: 0 | Status: COMPLETED | OUTPATIENT
Start: 2019-06-23 | End: 2019-06-23

## 2019-06-23 RX ORDER — CEPHALEXIN 500 MG
500 CAPSULE ORAL ONCE
Refills: 0 | Status: COMPLETED | OUTPATIENT
Start: 2019-06-23 | End: 2019-06-23

## 2019-06-23 RX ORDER — ONDANSETRON 8 MG/1
4 TABLET, FILM COATED ORAL ONCE
Refills: 0 | Status: COMPLETED | OUTPATIENT
Start: 2019-06-23 | End: 2019-06-23

## 2019-06-23 RX ADMIN — Medication 500 MILLIGRAM(S): at 16:02

## 2019-06-23 RX ADMIN — ONDANSETRON 4 MILLIGRAM(S): 8 TABLET, FILM COATED ORAL at 14:35

## 2019-06-23 RX ADMIN — SODIUM CHLORIDE 1000 MILLILITER(S): 9 INJECTION INTRAMUSCULAR; INTRAVENOUS; SUBCUTANEOUS at 14:35

## 2019-06-23 NOTE — ED PROVIDER NOTE - PROGRESS NOTE DETAILS
Urine pregnancy positive, labs within normal limits. Urine shows possible contaminant with few bacteria, will tx with keflex. Tolerating PO well, will DC with f/u OBGYN

## 2019-06-23 NOTE — ED PROVIDER NOTE - OBJECTIVE STATEMENT
25yo F with no pmh/psh presents today with nausea/vomiting/diarrhea. Diarrhea for 4 days, 3 times each day. Tolerating PO well. Diarrhea loose brown stools. no blood. Vomited twice yesterday, NBNB. No fevers, chills. No sick contacts or anyone with similar symptoms. No abdominal surgeries in the past. LMP 5/23. no vaginal bleeding. Possibility of pregnancy. denies abdominal pain, only cramping right before BM

## 2019-06-23 NOTE — ED PROVIDER NOTE - ATTENDING CONTRIBUTION TO CARE
25yo F with no pmh/psh presents today with nausea/vomiting/diarrhea. Diarrhea for 4 days, 3 times each day. Tolerating PO well. Diarrhea loose brown stools. no blood. Vomited twice yesterday, NBNB. No fevers, chills. No sick contacts or anyone with similar symptoms. No abdominal surgeries in the past. LMP 5/23. no vaginal bleeding. Possibility of pregnancy. denies abdominal pain, only cramping right before BM On exam: VSS lungs, heart, pulses, abdomen, neuro, extremities, skin, all normal on exam. Of note ucg positive. awaiting labs UA. Treated with antiemetic and fluids

## 2019-06-23 NOTE — ED PROVIDER NOTE - NSFOLLOWUPCLINICS_GEN_ALL_ED_FT
[facility] Gynecology and Obstetrics  Gynceology/OB  [address] [place] [address]  Phone: [phone]  Fax:   Follow Up Time:

## 2019-06-23 NOTE — ED PROVIDER NOTE - NSFOLLOWUPINSTRUCTIONS_ED_ALL_ED_FT
You were seen in the ER for nausea/vomiting/diarrhea.  You are 4 weeks pregnant.  Labs were within normal limits.  Take doxylamine, pyridoxine for nausea and vomiting (over the counter)  Return to ER for life threatening illnesses.  Follow up with your OBGYN

## 2019-06-23 NOTE — ED PROVIDER NOTE - CLINICAL SUMMARY MEDICAL DECISION MAKING FREE TEXT BOX
nausea/vomiting/diarrhea  without fevers - Will check labs, urine, 1L NS, zofran, PO challenge. r/o pregnancy

## 2019-07-18 ENCOUNTER — EMERGENCY (EMERGENCY)
Facility: HOSPITAL | Age: 24
LOS: 1 days | Discharge: HOME | End: 2019-07-18
Attending: EMERGENCY MEDICINE | Admitting: EMERGENCY MEDICINE
Payer: COMMERCIAL

## 2019-07-18 VITALS
SYSTOLIC BLOOD PRESSURE: 125 MMHG | DIASTOLIC BLOOD PRESSURE: 82 MMHG | TEMPERATURE: 99 F | OXYGEN SATURATION: 100 % | HEART RATE: 91 BPM | RESPIRATION RATE: 18 BRPM

## 2019-07-18 VITALS
DIASTOLIC BLOOD PRESSURE: 77 MMHG | SYSTOLIC BLOOD PRESSURE: 117 MMHG | TEMPERATURE: 98 F | HEART RATE: 81 BPM | RESPIRATION RATE: 18 BRPM | OXYGEN SATURATION: 100 %

## 2019-07-18 LAB
ALBUMIN SERPL ELPH-MCNC: 4.1 G/DL — SIGNIFICANT CHANGE UP (ref 3.3–5)
ALP SERPL-CCNC: 54 U/L — SIGNIFICANT CHANGE UP (ref 40–120)
ALT FLD-CCNC: 12 U/L — SIGNIFICANT CHANGE UP (ref 4–33)
ANION GAP SERPL CALC-SCNC: 12 MMO/L — SIGNIFICANT CHANGE UP (ref 7–14)
AST SERPL-CCNC: 23 U/L — SIGNIFICANT CHANGE UP (ref 4–32)
BASE EXCESS BLDV CALC-SCNC: -0.2 MMOL/L — SIGNIFICANT CHANGE UP
BASOPHILS # BLD AUTO: 0.05 K/UL — SIGNIFICANT CHANGE UP (ref 0–0.2)
BASOPHILS NFR BLD AUTO: 0.5 % — SIGNIFICANT CHANGE UP (ref 0–2)
BILIRUB SERPL-MCNC: 0.3 MG/DL — SIGNIFICANT CHANGE UP (ref 0.2–1.2)
BLOOD GAS VENOUS - CREATININE: 0.69 MG/DL — SIGNIFICANT CHANGE UP (ref 0.5–1.3)
BLOOD GAS VENOUS - FIO2: 21 — SIGNIFICANT CHANGE UP
BUN SERPL-MCNC: 7 MG/DL — SIGNIFICANT CHANGE UP (ref 7–23)
CALCIUM SERPL-MCNC: 9.7 MG/DL — SIGNIFICANT CHANGE UP (ref 8.4–10.5)
CHLORIDE BLDV-SCNC: 111 MMOL/L — HIGH (ref 96–108)
CHLORIDE SERPL-SCNC: 106 MMOL/L — SIGNIFICANT CHANGE UP (ref 98–107)
CO2 SERPL-SCNC: 22 MMOL/L — SIGNIFICANT CHANGE UP (ref 22–31)
CREAT SERPL-MCNC: 0.7 MG/DL — SIGNIFICANT CHANGE UP (ref 0.5–1.3)
EOSINOPHIL # BLD AUTO: 1.16 K/UL — HIGH (ref 0–0.5)
EOSINOPHIL NFR BLD AUTO: 11 % — HIGH (ref 0–6)
GAS PNL BLDV: 137 MMOL/L — SIGNIFICANT CHANGE UP (ref 136–146)
GLUCOSE BLDV-MCNC: 75 MG/DL — SIGNIFICANT CHANGE UP (ref 70–99)
GLUCOSE SERPL-MCNC: 78 MG/DL — SIGNIFICANT CHANGE UP (ref 70–99)
HCG SERPL-ACNC: 2135 MIU/ML — SIGNIFICANT CHANGE UP
HCO3 BLDV-SCNC: 23 MMOL/L — SIGNIFICANT CHANGE UP (ref 20–27)
HCT VFR BLD CALC: 36.7 % — SIGNIFICANT CHANGE UP (ref 34.5–45)
HCT VFR BLDV CALC: 35.7 % — SIGNIFICANT CHANGE UP (ref 34.5–45)
HGB BLD-MCNC: 11.3 G/DL — LOW (ref 11.5–15.5)
HGB BLDV-MCNC: 11.6 G/DL — SIGNIFICANT CHANGE UP (ref 11.5–15.5)
IMM GRANULOCYTES NFR BLD AUTO: 0.6 % — SIGNIFICANT CHANGE UP (ref 0–1.5)
LACTATE BLDV-MCNC: 1.2 MMOL/L — SIGNIFICANT CHANGE UP (ref 0.5–2)
LYMPHOCYTES # BLD AUTO: 29.8 % — SIGNIFICANT CHANGE UP (ref 13–44)
LYMPHOCYTES # BLD AUTO: 3.13 K/UL — SIGNIFICANT CHANGE UP (ref 1–3.3)
MCHC RBC-ENTMCNC: 25.9 PG — LOW (ref 27–34)
MCHC RBC-ENTMCNC: 30.8 % — LOW (ref 32–36)
MCV RBC AUTO: 84 FL — SIGNIFICANT CHANGE UP (ref 80–100)
MONOCYTES # BLD AUTO: 0.53 K/UL — SIGNIFICANT CHANGE UP (ref 0–0.9)
MONOCYTES NFR BLD AUTO: 5 % — SIGNIFICANT CHANGE UP (ref 2–14)
NEUTROPHILS # BLD AUTO: 5.59 K/UL — SIGNIFICANT CHANGE UP (ref 1.8–7.4)
NEUTROPHILS NFR BLD AUTO: 53.1 % — SIGNIFICANT CHANGE UP (ref 43–77)
NRBC # FLD: 0 K/UL — SIGNIFICANT CHANGE UP (ref 0–0)
PCO2 BLDV: 48 MMHG — SIGNIFICANT CHANGE UP (ref 41–51)
PH BLDV: 7.34 PH — SIGNIFICANT CHANGE UP (ref 7.32–7.43)
PLATELET # BLD AUTO: 349 K/UL — SIGNIFICANT CHANGE UP (ref 150–400)
PMV BLD: 10.5 FL — SIGNIFICANT CHANGE UP (ref 7–13)
PO2 BLDV: 27 MMHG — LOW (ref 35–40)
POTASSIUM BLDV-SCNC: 4 MMOL/L — SIGNIFICANT CHANGE UP (ref 3.4–4.5)
POTASSIUM SERPL-MCNC: 4.8 MMOL/L — SIGNIFICANT CHANGE UP (ref 3.5–5.3)
POTASSIUM SERPL-SCNC: 4.8 MMOL/L — SIGNIFICANT CHANGE UP (ref 3.5–5.3)
PROT SERPL-MCNC: 7.3 G/DL — SIGNIFICANT CHANGE UP (ref 6–8.3)
RBC # BLD: 4.37 M/UL — SIGNIFICANT CHANGE UP (ref 3.8–5.2)
RBC # FLD: 13.9 % — SIGNIFICANT CHANGE UP (ref 10.3–14.5)
SAO2 % BLDV: 39.3 % — LOW (ref 60–85)
SODIUM SERPL-SCNC: 140 MMOL/L — SIGNIFICANT CHANGE UP (ref 135–145)
WBC # BLD: 10.52 K/UL — HIGH (ref 3.8–10.5)
WBC # FLD AUTO: 10.52 K/UL — HIGH (ref 3.8–10.5)

## 2019-07-18 PROCEDURE — 70487 CT MAXILLOFACIAL W/DYE: CPT | Mod: 26

## 2019-07-18 PROCEDURE — 99285 EMERGENCY DEPT VISIT HI MDM: CPT

## 2019-07-18 PROCEDURE — 76830 TRANSVAGINAL US NON-OB: CPT | Mod: 26

## 2019-07-18 RX ORDER — IBUPROFEN 200 MG
1 TABLET ORAL
Qty: 28 | Refills: 0
Start: 2019-07-18 | End: 2019-07-24

## 2019-07-18 RX ORDER — ACETAMINOPHEN 500 MG
975 TABLET ORAL ONCE
Refills: 0 | Status: COMPLETED | OUTPATIENT
Start: 2019-07-18 | End: 2019-07-18

## 2019-07-18 RX ORDER — CHLORHEXIDINE GLUCONATE 213 G/1000ML
15 SOLUTION TOPICAL
Qty: 900 | Refills: 0
Start: 2019-07-18 | End: 2019-08-16

## 2019-07-18 RX ORDER — KETOROLAC TROMETHAMINE 30 MG/ML
30 SYRINGE (ML) INJECTION ONCE
Refills: 0 | Status: COMPLETED | OUTPATIENT
Start: 2019-07-18 | End: 2019-07-18

## 2019-07-18 RX ADMIN — Medication 975 MILLIGRAM(S): at 15:46

## 2019-07-18 NOTE — ED PROVIDER NOTE - PLAN OF CARE
Patient advised to follow up with PRIMARY CARE DOCTOR 1-2 DAYS AND OB/GYN WITHIN 72 HOURS AND DENTAL WITHIN WEEK and told to return to the emergency department immediately for any new or concerning symptoms such as fevers, chills, abdominal pain, nausea, vomiting, worsening bleeding  OR ANY OTHER COMPLAINTS. Patient agrees with plan.    Take medications as directed   Follow up with Gynecologist OB/GYN (referal list provided or call [phone] to make an appointment with the OB/GYN clinic)    Follow up with Dr. James and oral surgery

## 2019-07-18 NOTE — ED PROVIDER NOTE - ATTENDING CONTRIBUTION TO CARE
I performed a face to face evaluation of this patient and obtained a history and performed a full exam.  I agree with the history, physical exam and plan of the PA.  Pt with dental pain and found to have +hcg from recent D+C- downtrending and no belly pain, fever, bleeding, and well appearing.    Pt with dental pain and tooth with possibility for infection- dental consult, referred for CT scan.  Patient also on pmhx/ROS with recent D+C without pain and soft belly- for hcg and Transvaginal US as pt needs CT scan of face and patient's procedure/results not available as from outside [facility]/outpatient procedure.  Pt ambulatory and well appearing except tooth findings- labs, pain meds and evaluate for post-D+C and possible dental abscess.

## 2019-07-18 NOTE — ED PROVIDER NOTE - CARE PLAN
Principal Discharge DX:	Tooth pain  Assessment and plan of treatment:	Patient advised to follow up with PRIMARY CARE DOCTOR 1-2 DAYS AND OB/GYN WITHIN 72 HOURS AND DENTAL WITHIN WEEK and told to return to the emergency department immediately for any new or concerning symptoms such as fevers, chills, abdominal pain, nausea, vomiting, worsening bleeding  OR ANY OTHER COMPLAINTS. Patient agrees with plan.    Take medications as directed   Follow up with Gynecologist OB/GYN (referal list provided or call [phone] to make an appointment with the OB/GYN clinic)    Follow up with Dr. James and oral surgery  Secondary Diagnosis:	Retained products of conception without hemorrhage

## 2019-07-18 NOTE — ED PROVIDER NOTE - OBJECTIVE STATEMENT
HPI: Patient is a 24 y.o female with no significant PMHx who presents to ED c/o Rt lower dental pain around tooth #31 x few days. As per patient states that she was previously having Lt side lower dental pain and was following with [facility] Dentist Dr. James and was advised she needed CT for better evaluation of tooth for possible extraction. Now patient having more pain on Rt side and states "feel a lot of pressure and swelling surrounding tooth". Admits to sensitivity with eating/drinking. Patient also mentions that she recently had D&C 1 week ago at an ambulatory center in [place], denies any abdominal/vaginal complaints Denies fevers, chills, discharge, drooling, tongue elevation, dental trauma, or any other complaints.

## 2019-07-18 NOTE — PROGRESS NOTE ADULT - SUBJECTIVE AND OBJECTIVE BOX
CC: 23 y/o F presents with pain in the posterior LR quadrant with no associated swelling starting a couple days ago.    Pt seen by OMFS at [facility] for consultation for removal of wisdom teeth. Pan taken in April 2019. Plan was to obtain CBCT prior to extractions, but pt postponed due to pregnancy. Pt reports miscarriage 6 days ago via DNC. Pt now reports pain on lower left and lower right sides. Presents to ED for evaluation of increasing dental pain on right side. ED team obtained CBCT. Pt denies fever, chills, nausea, difficulty breathing/swelling.    Med HX:  Iron deficiency anemia, Vitamin D deficiency. Recent miscarriage, DNC completed.  Meds: Vitamin D, Iron supplement   Allergies: NKDA    EOE:   TMJ: FROM, pain upon lateral excursion to the right side.  Trismus (-)  LAD (-)  Dysphagia (-)  Swelling (-)    IOE: Permanent dentition. Impacted #32 partially erupted with inflamed operculum and surrounding gingival tissue, sensitive to palpation. No purulence secretion upon palpation of tissue.  Hard/Soft palate (WNL)  Tongue/Floor of Mouth (WNL)  Buccal Mucosa (WNL)  Percussion (+) #31  Palpation (+) #31  Mobility (+) + mobility noted on #31  Swelling (-)    Radiographs: None taken. Reviewed pan taken in April 2019: Erupted #1 and #16, #17 and #32 mesioangular PBI  CBCT previously requested by med team.     Assessment: . Pericoronitis on partially erupted #32    Treatment: Discussed case with OMFS resident on call. Discussed clinical and radiographic findings with patient. With verbal consent, copious irrigation with saline of operculum surrounding #32. Recommended patient make follow up appointment with [facility] provider. All questions answered.     Recommendations:   1. Rx amoxicillin, chlorhexidine rinse, and pain medication as per med team.  2. Soft diet, avoid eating on posterior right side.  3. F/u with [facility] dental clinic for extraction of 3rd molars, [phone].  4. If any difficulty breathing/swallowing or fever and swelling occur, return to ED.    Nicolas Dia DDS  #[id number]  Dyan Layne DDS  #[id number]  Main Wharton DDS  #[id number] CC: 25 y/o F presents with pain in the posterior LR quadrant with no associated swelling starting a couple days ago.    Pt seen by OMFS at [facility] for consultation for removal of wisdom teeth. Pan taken in April 2019. Plan was to obtain CBCT prior to extractions, but pt postponed due to pregnancy. Pt reports miscarriage 6 days ago via DNC. Pt now reports pain on lower left and lower right sides. Presents to ED for evaluation of increasing dental pain on right side. ED team obtained CBCT. Pt denies fever, chills, nausea, difficulty breathing/swelling.    Med HX:  Iron deficiency anemia, Vitamin D deficiency. Recent miscarriage, DNC completed.  Meds: Vitamin D, Iron supplement   Allergies: NKDA    EOE:   TMJ: FROM, pain upon lateral excursion to the right side.  Trismus (-)  LAD (-)  Dysphagia (-)  Swelling (-)    IOE: Permanent dentition. Impacted #32 partially erupted with inflamed operculum and surrounding gingival tissue, sensitive to palpation. No purulence secretion upon palpation of tissue.  Hard/Soft palate (WNL)  Tongue/Floor of Mouth (WNL)  Buccal Mucosa (WNL)  Percussion (+) #31  Palpation (+) #31  Mobility (+) slight mobility on #31  Swelling (-)    Radiographs: None taken. Reviewed pan taken in April 2019: Erupted #1 and #16, #17 and #32 mesioangular PBI  CBCT previously requested by med team.  IMPRESSION: No evidence for facial abscess    Assessment: . Pericoronitis on partially erupted #32    Treatment: Discussed case with OMFS resident on call. Discussed clinical and radiographic findings with patient. With verbal consent, copious irrigation with saline of operculum surrounding #32. Recommended patient make follow up appointment with [facility] OMFS provider. All questions answered.     Recommendations:   1. Rx amoxicillin, chlorhexidine rinse, and pain medication as per med team.  2. Soft diet, avoid eating on posterior right side.  3. F/u with [facility] dental clinic for extraction of 3rd molars, [phone].  4. If any difficulty breathing/swallowing or fever and swelling occur, return to ED.    Nicolas Dia DDS  #[id number]  Dyan Layne DDS  #[id number]  Main Wharton DDS  #[id number]

## 2019-07-18 NOTE — ED PROVIDER NOTE - PHYSICAL EXAMINATION
Vital signs reviewed.   CONSTITUTIONAL: Well-appearing; well-nourished; in no apparent distress. Non-toxic appearing.   HEAD: Normocephalic, atraumatic.  EYES: PERRL, EOM intact, conjunctiva and sclera WNL.  ENT: normal nose; no rhinorrhea; normal pharynx with no tonsillar hypertrophy, no erythema, no exudate, no lymphadenopathy. +surrounding erythema and swelling around tooth #31. Appears to be local dental abscess with +TTP noted. Also appears to be impacted wisdom tooth. Mild submandibular tenderness and swelling appreciated. No tongue elevation. No ludwigs. Pt maintaining secretions. speaking in full sentences.   NECK/LYMPH: Supple; non-tender; no cervical lymphadenopathy.  CARD: Normal S1, S2; no murmurs, rubs, or gallops noted.  RESP: Normal chest excursion with respiration; breath sounds clear and equal bilaterally; no wheezes, rhonchi, or rales.  ABD/GI: soft, non-distended; non-tender;  EXT/MS: moves all extremities;   SKIN: Normal for age and race; warm; dry; good turgor; no apparent lesions or exudate noted. No signs of cellulitis or lymphangitis noted.   NEURO: Awake, alert, oriented x 3, no gross deficits  PSYCH: Normal mood; appropriate affect.

## 2019-07-18 NOTE — ED ADULT NURSE NOTE - OBJECTIVE STATEMENT
Pt states she has bilateral jaw pain right side more painful than left 8/10  Pt A&OX4 Lungs clear Skin warm to touch no facial swelling noted.  #20 angiocath placed into [facility] labs sent awaiting UCG results for pain management. Pt awaiting CT of maxillary region

## 2019-07-18 NOTE — ED PROVIDER NOTE - NSFOLLOWUPINSTRUCTIONS_ED_ALL_ED_FT
Patient advised to follow up with PRIMARY CARE DOCTOR 1-2 DAYS AND OB/GYN WITHIN 72 HOURS AND DENTAL WITHIN WEEK and told to return to the emergency department immediately for any new or concerning symptoms such as fevers, chills, abdominal pain, nausea, vomiting, worsening bleeding  OR ANY OTHER COMPLAINTS. Patient agrees with plan.    Take medications as directed   Follow up with Gynecologist OB/GYN (referal list provided or call [phone] to make an appointment with the OB/GYN clinic)    Follow up with Dr. James and oral surgery

## 2019-07-18 NOTE — ED PROVIDER NOTE - PROGRESS NOTE DETAILS
Patients TVUS showing retained products of conception. Pt originally denied any vaginal bleeding but now states that she has had bleeding, but was told that is normal d&c. Admits bleeding subsiding. Denies abdominal pain/n/v. Will consult OB/GYN team. Pt pending CT results. Call placed to Dental regarding dental abscess and impacted wisdom tooth. CT scan performed but results pending. Dental states they will evaluate. Spoke to GYN resident, states will review case with attending and call back. Spoke to GYN- State that patient stable to f/u outpatient. Also spoke to dental states they discussed case with OMFS and they recommend patient be dc on abx, peridex and pain control and patient to f/u outpatient with them. Pt aware of plan. Aware needs to f/u with OB/GYN outpatient as soon as possible and strict return precautions given. All question and concerns addressed. Strict return instructions given.

## 2019-07-18 NOTE — ED PROVIDER NOTE - CLINICAL SUMMARY MEDICAL DECISION MAKING FREE TEXT BOX
This pt is scheduled for a colonoscopy on 4/4/18    Can she hold her Plavix for 3-5 days prior to procedure?   Pt with dental pain and tooth with possibility for infection- dental consult, referred for CT scan.  Patient also on pmhx/ROS with recent D+C without pain and soft belly- for hcg and Transvaginal US as pt needs CT scan of face and patient's procedure/results not available as from outside [facility]/outpatient procedure.  Pt ambulatory and well appearing except tooth findings- labs, pain meds and evaluate for post-D+C and possible dental abscess.

## 2019-07-18 NOTE — ED PROVIDER NOTE - CARE PROVIDER_API CALL
Brenton James (DDS; MD)  OralMaxillofacial Surgery  [id number] [place] [address]  Phone: [phone]  Fax: [phone]  Follow Up Time: 1-3 Days

## 2019-08-21 ENCOUNTER — APPOINTMENT (OUTPATIENT)
Dept: OTOLARYNGOLOGY | Facility: CLINIC | Age: 24
End: 2019-08-21
Payer: COMMERCIAL

## 2019-08-21 VITALS
HEIGHT: 63 IN | SYSTOLIC BLOOD PRESSURE: 112 MMHG | HEART RATE: 55 BPM | BODY MASS INDEX: 24.8 KG/M2 | DIASTOLIC BLOOD PRESSURE: 79 MMHG | WEIGHT: 140 LBS

## 2019-08-21 DIAGNOSIS — J34.89 OTHER SPECIFIED DISORDERS OF NOSE AND NASAL SINUSES: ICD-10-CM

## 2019-08-21 DIAGNOSIS — R09.81 NASAL CONGESTION: ICD-10-CM

## 2019-08-21 PROCEDURE — 31231 NASAL ENDOSCOPY DX: CPT

## 2019-08-21 PROCEDURE — 99214 OFFICE O/P EST MOD 30 MIN: CPT | Mod: 25

## 2019-08-21 RX ORDER — AZELASTINE HYDROCHLORIDE 137 UG/1
137 SPRAY, METERED NASAL TWICE DAILY
Qty: 1 | Refills: 5 | Status: ACTIVE | COMMUNITY
Start: 2019-08-21 | End: 1900-01-01

## 2019-08-21 RX ORDER — MOMETASONE 50 UG/1
50 SPRAY, METERED NASAL TWICE DAILY
Qty: 1 | Refills: 5 | Status: ACTIVE | COMMUNITY
Start: 2019-08-21 | End: 1900-01-01

## 2019-08-23 NOTE — ASSESSMENT
[FreeTextEntry1] : nasal congestion with ITH:\par - start nasonex and astelin BID\par - f/u 4-6 weeks and if symptoms persist can consider turbinate reduction

## 2019-08-23 NOTE — PROCEDURE
[FreeTextEntry6] : Afrin and lidocaine were topically sprayed. Flexible scope #2 was used. Right nasal passage with hypertrophied inferior, middle and superior turbinates. Nasal passage patent with clear middle meatus and sphenoethmoid recess. Left nasal passage with hypertrophied inferior, middle and superior turbinates. Nasal passage was patent with clear middle meatus and sphenoethmoid recess. No mucopurulence or polyps appreciated. Nasopharynx clear.

## 2019-08-23 NOTE — PHYSICAL EXAM
[Normal] : mucosa is normal [Midline] : trachea located in midline position [de-identified] : boggy edematous mucosa

## 2019-08-23 NOTE — HISTORY OF PRESENT ILLNESS
[de-identified] : 25 y/o F with chronic nasal congestion b/l for the past 2 yeas, symptoms started following pregnancy.  Pos sinus pain and pressure with headache.  No sinus infections.  Reports having been seen by 4 allergists and notes has multiple allergies.  Currently using Zyrtec, sinus wash and Flonase without improvement.   Has used PO steroids in the past with improvement while taking them, however symptoms returned once off.

## 2019-09-24 ENCOUNTER — APPOINTMENT (OUTPATIENT)
Dept: PSYCHIATRY | Facility: CLINIC | Age: 24
End: 2019-09-24
Payer: COMMERCIAL

## 2019-09-24 PROCEDURE — 99205 OFFICE O/P NEW HI 60 MIN: CPT

## 2019-09-27 ENCOUNTER — APPOINTMENT (OUTPATIENT)
Dept: OTOLARYNGOLOGY | Facility: CLINIC | Age: 24
End: 2019-09-27

## 2019-10-02 ENCOUNTER — OTHER (OUTPATIENT)
Age: 24
End: 2019-10-02

## 2019-10-02 ENCOUNTER — APPOINTMENT (OUTPATIENT)
Dept: OBGYN | Facility: CLINIC | Age: 24
End: 2019-10-02
Payer: COMMERCIAL

## 2019-10-02 VITALS
WEIGHT: 132 LBS | SYSTOLIC BLOOD PRESSURE: 114 MMHG | DIASTOLIC BLOOD PRESSURE: 78 MMHG | HEART RATE: 60 BPM | BODY MASS INDEX: 23.39 KG/M2 | HEIGHT: 63 IN

## 2019-10-02 DIAGNOSIS — Z63.9 PROBLEM RELATED TO PRIMARY SUPPORT GROUP, UNSPECIFIED: ICD-10-CM

## 2019-10-02 DIAGNOSIS — N92.6 IRREGULAR MENSTRUATION, UNSPECIFIED: ICD-10-CM

## 2019-10-02 DIAGNOSIS — H61.23 IMPACTED CERUMEN, BILATERAL: ICD-10-CM

## 2019-10-02 PROCEDURE — 99395 PREV VISIT EST AGE 18-39: CPT

## 2019-10-02 PROCEDURE — 99213 OFFICE O/P EST LOW 20 MIN: CPT | Mod: 25

## 2019-10-02 SDOH — SOCIAL STABILITY - SOCIAL INSECURITY: PROBLEM RELATED TO PRIMARY SUPPORT GROUP, UNSPECIFIED: Z63.9

## 2019-10-02 NOTE — PHYSICAL EXAM
[Awake] : awake [Alert] : alert [Mass] : no breast mass [Acute Distress] : no acute distress [Nipple Discharge] : no nipple discharge [Axillary LAD] : no axillary lymphadenopathy [Soft] : soft [Tender] : non tender [Distended] : not distended [Oriented x3] : oriented to person, place, and time [Depressed Mood] : not depressed [Flat Affect] : affect not flat [No Lesions] : no genitalia lesions [Normal] : cervix [Labia Majora] : labia major [Labia Minora] : labia minora [Discharge] : a  ~M vaginal discharge was present [Moderate] : moderate [White] : white [Mucoid] : mucoid [No Bleeding] : there was no active vaginal bleeding [Pap Obtained] : a Pap smear was performed [Motion Tenderness] : there was no cervical motion tenderness [Tenderness] : nontender [Adnexa Tenderness] : were not tender

## 2019-10-02 NOTE — HISTORY OF PRESENT ILLNESS
[___ Year(s) Ago] : [unfilled] year(s) ago [Last Pap ___] : Last cervical pap smear was [unfilled] [Good] : being in good health [Healthy Diet] : a healthy diet [Regular Exercise] : regular exercise [Weight Concerns] : no concerns with her weight

## 2019-10-03 ENCOUNTER — EMERGENCY (EMERGENCY)
Facility: HOSPITAL | Age: 24
LOS: 1 days | Discharge: HOME | End: 2019-10-03
Attending: EMERGENCY MEDICINE | Admitting: EMERGENCY MEDICINE
Payer: COMMERCIAL

## 2019-10-03 VITALS
SYSTOLIC BLOOD PRESSURE: 124 MMHG | OXYGEN SATURATION: 99 % | HEART RATE: 74 BPM | DIASTOLIC BLOOD PRESSURE: 74 MMHG | RESPIRATION RATE: 16 BRPM | TEMPERATURE: 98 F

## 2019-10-03 PROCEDURE — 99283 EMERGENCY DEPT VISIT LOW MDM: CPT

## 2019-10-03 RX ORDER — ACETAMINOPHEN 500 MG
975 TABLET ORAL ONCE
Refills: 0 | Status: COMPLETED | OUTPATIENT
Start: 2019-10-03 | End: 2019-10-03

## 2019-10-03 RX ORDER — IBUPROFEN 200 MG
600 TABLET ORAL ONCE
Refills: 0 | Status: COMPLETED | OUTPATIENT
Start: 2019-10-03 | End: 2019-10-03

## 2019-10-03 RX ADMIN — Medication 975 MILLIGRAM(S): at 16:41

## 2019-10-03 RX ADMIN — Medication 600 MILLIGRAM(S): at 16:41

## 2019-10-03 NOTE — ED PROVIDER NOTE - PATIENT PORTAL LINK FT
You can access the FollowMyHealth Patient Portal offered by [facility] by registering at the following website: [email or web address]. By joining [employer]’s FollowMyHealth portal, you will also be able to view your health information using other applications (apps) compatible with our system.
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
## 2019-10-03 NOTE — ED PROVIDER NOTE - PROGRESS NOTE DETAILS
Joseph Frankel PGY1: Patient feeling better after medication. Will dc with follow up. Discussed plan and return precautions with patient and family who understand and agree. Joseph Frankel PGY1: Patient states she is not pregnant. Patient informed of risks of NSAIDs if she is pregnant and denies further testing to assess whether she is pregnant. Will give NSAID.
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
## 2019-10-03 NOTE — ED PROVIDER NOTE - PHYSICAL EXAMINATION
GENERAL: Patient lying in bed comfortably. In no acute distress. Answering questions appropriately.   HEENT: NC, Small contusion on forehead, PERRL, EOMI b/l, conjunctiva noninjected and anicteric,  moist mucous membranes  NECK: Paravertebral tenderness on right side of neck, trachea midline, Able to range neck left and right without pain.   LUNG: CTAB, no w/r/r appreciated, good respiratory effort  CV: RRR, no m/r/g appreciated  ABDOMEN: Soft, NTND, no rebound or guarding, no appreciable organomegaly  MSK: Right sided back tenderness. No midline tenderness. No edema, no visible deformities, full range of motion UE/LE b/l, 5/5 muscle strength UE/LE b/l  NEURO: AAOx4 (to person, place, time, event), CN II-XII grossly intact, sensation grossly intact, finger-to-nose smooth and rapid, no pronator drift, steady gait,  -Cranial Nerves:  --CN II: PERRLA  --CN III, IV, VI: EOMI b/l  --CN V1-3: Facial sensation intact to touch  --CN VII: No facial asymmetry or droop  --CN VIII: Hearing intact to rubbing fingers b/l  --CN IX, X: Palate elevates symmetrically. Normal phonation  --CN XI: Heading turning and shoulder shrug intact b/l  --CN XII: Tongue midline with normal movements  SKIN: Warm, dry, well perfused, no evidence of rash  PSYCH: Normal mood and affect

## 2019-10-03 NOTE — ED PROVIDER NOTE - NS ED ROS FT
Gen: Denies fever, weight loss  CV: Denies chest pain, palpitations  HEENT: Denies neck pain, sore throat, eye discharge  Skin: Denies rash, erythema, color changes  Resp: Denies SOB, cough  Endo: Denies sensitivity to heat, cold, increased urination  GI: Denies constipation, nausea, vomiting  Msk: + back pain, denies LE swelling, extremity pain  : Denies dysuria, increased frequency  Neuro: Denies LOC, weakness, seizures  Psych: Denies hx of psych, hallucinations, SI

## 2019-10-08 ENCOUNTER — APPOINTMENT (OUTPATIENT)
Dept: OBGYN | Facility: CLINIC | Age: 24
End: 2019-10-08
Payer: COMMERCIAL

## 2019-10-08 ENCOUNTER — ASOB RESULT (OUTPATIENT)
Age: 24
End: 2019-10-08

## 2019-10-08 PROCEDURE — 76830 TRANSVAGINAL US NON-OB: CPT

## 2019-10-09 ENCOUNTER — OTHER (OUTPATIENT)
Age: 24
End: 2019-10-09

## 2019-10-09 RX ORDER — ETONOGESTREL AND ETHINYL ESTRADIOL .12; .015 MG/D; MG/D
0.12-0.015 INSERT, EXTENDED RELEASE VAGINAL
Qty: 3 | Refills: 3 | Status: ACTIVE | COMMUNITY
Start: 2019-10-09 | End: 1900-01-01

## 2019-10-15 ENCOUNTER — OTHER (OUTPATIENT)
Age: 24
End: 2019-10-15

## 2019-10-15 LAB
C TRACH RRNA SPEC QL NAA+PROBE: NOT DETECTED
CANDIDA VAG CYTO: NOT DETECTED
CYTOLOGY CVX/VAG DOC THIN PREP: NORMAL
G VAGINALIS+PREV SP MTYP VAG QL MICRO: NOT DETECTED
HCG SERPL-MCNC: 1 MIU/ML
N GONORRHOEA RRNA SPEC QL NAA+PROBE: NOT DETECTED
SOURCE AMPLIFICATION: NORMAL
T VAGINALIS VAG QL WET PREP: NOT DETECTED
TSH SERPL-ACNC: 2.17 UIU/ML

## 2019-10-22 ENCOUNTER — APPOINTMENT (OUTPATIENT)
Dept: PSYCHIATRY | Facility: CLINIC | Age: 24
End: 2019-10-22
Payer: COMMERCIAL

## 2019-10-22 PROCEDURE — 99214 OFFICE O/P EST MOD 30 MIN: CPT

## 2019-10-24 ENCOUNTER — TRANSCRIPTION ENCOUNTER (OUTPATIENT)
Age: 24
End: 2019-10-24

## 2020-01-01 ENCOUNTER — TRANSCRIPTION ENCOUNTER (OUTPATIENT)
Age: 25
End: 2020-01-01

## 2020-01-04 ENCOUNTER — EMERGENCY (EMERGENCY)
Facility: HOSPITAL | Age: 25
LOS: 1 days | Discharge: HOME | End: 2020-01-04
Attending: EMERGENCY MEDICINE | Admitting: EMERGENCY MEDICINE
Payer: COMMERCIAL

## 2020-01-04 VITALS
HEART RATE: 90 BPM | SYSTOLIC BLOOD PRESSURE: 130 MMHG | OXYGEN SATURATION: 100 % | DIASTOLIC BLOOD PRESSURE: 73 MMHG | RESPIRATION RATE: 17 BRPM | TEMPERATURE: 98 F

## 2020-01-04 LAB
ALBUMIN SERPL ELPH-MCNC: 4.4 G/DL — SIGNIFICANT CHANGE UP (ref 3.3–5)
ALP SERPL-CCNC: 60 U/L — SIGNIFICANT CHANGE UP (ref 40–120)
ALT FLD-CCNC: 12 U/L — SIGNIFICANT CHANGE UP (ref 4–33)
ANION GAP SERPL CALC-SCNC: 13 MMO/L — SIGNIFICANT CHANGE UP (ref 7–14)
AST SERPL-CCNC: 18 U/L — SIGNIFICANT CHANGE UP (ref 4–32)
BILIRUB SERPL-MCNC: < 0.2 MG/DL — LOW (ref 0.2–1.2)
BUN SERPL-MCNC: 9 MG/DL — SIGNIFICANT CHANGE UP (ref 7–23)
CALCIUM SERPL-MCNC: 9.8 MG/DL — SIGNIFICANT CHANGE UP (ref 8.4–10.5)
CHLORIDE SERPL-SCNC: 103 MMOL/L — SIGNIFICANT CHANGE UP (ref 98–107)
CO2 SERPL-SCNC: 20 MMOL/L — LOW (ref 22–31)
CREAT SERPL-MCNC: 0.68 MG/DL — SIGNIFICANT CHANGE UP (ref 0.5–1.3)
GLUCOSE SERPL-MCNC: 89 MG/DL — SIGNIFICANT CHANGE UP (ref 70–99)
POTASSIUM SERPL-MCNC: 4 MMOL/L — SIGNIFICANT CHANGE UP (ref 3.5–5.3)
POTASSIUM SERPL-SCNC: 4 MMOL/L — SIGNIFICANT CHANGE UP (ref 3.5–5.3)
PROT SERPL-MCNC: 7.5 G/DL — SIGNIFICANT CHANGE UP (ref 6–8.3)
SODIUM SERPL-SCNC: 136 MMOL/L — SIGNIFICANT CHANGE UP (ref 135–145)

## 2020-01-04 PROCEDURE — 99283 EMERGENCY DEPT VISIT LOW MDM: CPT

## 2020-01-04 RX ORDER — ULTRAMICROSIZE GRISEOFULVIN 250 MG/1
2 TABLET ORAL
Qty: 88 | Refills: 0
Start: 2020-01-04 | End: 2020-02-16

## 2020-01-04 NOTE — ED PROVIDER NOTE - PATIENT PORTAL LINK FT
You can access the FollowMyHealth Patient Portal offered by [facility] by registering at the following website: [email or web address]. By joining [employer]’s FollowMyHealth portal, you will also be able to view your health information using other applications (apps) compatible with our system.

## 2020-01-04 NOTE — ED PROVIDER NOTE - SKIN, MLM
Skin normal color for race, warm, dry and intact.. +rash on skin with flakes. non ttp, no induration.

## 2020-01-04 NOTE — ED PROVIDER NOTE - CLINICAL SUMMARY MEDICAL DECISION MAKING FREE TEXT BOX
pt p/w tinea capitus, caught from daughter, requesting griseofulvin. will access lfts, upreg and d/c. with derm f/u.

## 2020-01-04 NOTE — ED PROVIDER NOTE - OBJECTIVE STATEMENT
23 y/o with no pmhx p/w signs of tinea capitus. exposure via daughter, started on griseofulvin, pruritus and small spot of skin pallor. no systemic symptoms. sexually active with no active pregnancy at this point.

## 2020-01-08 ENCOUNTER — TRANSCRIPTION ENCOUNTER (OUTPATIENT)
Age: 25
End: 2020-01-08

## 2020-01-17 ENCOUNTER — APPOINTMENT (OUTPATIENT)
Dept: PSYCHIATRY | Facility: CLINIC | Age: 25
End: 2020-01-17

## 2020-03-30 ENCOUNTER — APPOINTMENT (OUTPATIENT)
Dept: OBGYN | Facility: CLINIC | Age: 25
End: 2020-03-30
Payer: COMMERCIAL

## 2020-03-30 ENCOUNTER — APPOINTMENT (OUTPATIENT)
Dept: OBGYN | Facility: CLINIC | Age: 25
End: 2020-03-30

## 2020-03-30 PROCEDURE — 76815 OB US LIMITED FETUS(S): CPT

## 2020-03-30 PROCEDURE — 36415 COLL VENOUS BLD VENIPUNCTURE: CPT

## 2020-03-30 PROCEDURE — 0500F INITIAL PRENATAL CARE VISIT: CPT

## 2020-04-15 ENCOUNTER — ASOB RESULT (OUTPATIENT)
Age: 25
End: 2020-04-15

## 2020-04-15 ENCOUNTER — APPOINTMENT (OUTPATIENT)
Dept: ANTEPARTUM | Facility: CLINIC | Age: 25
End: 2020-04-15
Payer: COMMERCIAL

## 2020-04-15 PROCEDURE — 76811 OB US DETAILED SNGL FETUS: CPT

## 2020-04-21 ENCOUNTER — APPOINTMENT (OUTPATIENT)
Dept: MATERNAL FETAL MEDICINE | Facility: CLINIC | Age: 25
End: 2020-04-21
Payer: COMMERCIAL

## 2020-04-21 ENCOUNTER — APPOINTMENT (OUTPATIENT)
Dept: ANTEPARTUM | Facility: CLINIC | Age: 25
End: 2020-04-21
Payer: COMMERCIAL

## 2020-04-21 ENCOUNTER — ASOB RESULT (OUTPATIENT)
Age: 25
End: 2020-04-21

## 2020-04-21 PROCEDURE — 99201 OFFICE OUTPATIENT NEW 10 MINUTES: CPT | Mod: 25

## 2020-04-21 PROCEDURE — 76816 OB US FOLLOW-UP PER FETUS: CPT

## 2020-04-24 ENCOUNTER — ASOB RESULT (OUTPATIENT)
Age: 25
End: 2020-04-24

## 2020-04-24 ENCOUNTER — APPOINTMENT (OUTPATIENT)
Dept: ANTEPARTUM | Facility: CLINIC | Age: 25
End: 2020-04-24

## 2020-04-25 ENCOUNTER — LABORATORY RESULT (OUTPATIENT)
Age: 25
End: 2020-04-25

## 2020-04-25 ENCOUNTER — MESSAGE (OUTPATIENT)
Age: 25
End: 2020-04-25

## 2020-05-03 ENCOUNTER — APPOINTMENT (OUTPATIENT)
Dept: DISASTER EMERGENCY | Facility: HOSPITAL | Age: 25
End: 2020-05-03

## 2020-05-04 LAB
SARS-COV-2 IGG SERPL IA-ACNC: 5.6 AU/ML
SARS-COV-2 IGG SERPL QL IA: NEGATIVE

## 2020-05-07 ENCOUNTER — APPOINTMENT (OUTPATIENT)
Dept: OBGYN | Facility: CLINIC | Age: 25
End: 2020-05-07
Payer: COMMERCIAL

## 2020-05-07 PROCEDURE — 0502F SUBSEQUENT PRENATAL CARE: CPT

## 2020-05-07 PROCEDURE — 59425 ANTEPARTUM CARE ONLY: CPT

## 2020-05-07 PROCEDURE — 36415 COLL VENOUS BLD VENIPUNCTURE: CPT

## 2020-05-22 ENCOUNTER — EMERGENCY (EMERGENCY)
Facility: HOSPITAL | Age: 25
LOS: 1 days | Discharge: HOME | End: 2020-05-22
Attending: EMERGENCY MEDICINE | Admitting: EMERGENCY MEDICINE
Payer: COMMERCIAL

## 2020-05-22 VITALS
SYSTOLIC BLOOD PRESSURE: 123 MMHG | DIASTOLIC BLOOD PRESSURE: 70 MMHG | OXYGEN SATURATION: 100 % | RESPIRATION RATE: 16 BRPM | HEART RATE: 83 BPM | TEMPERATURE: 99 F

## 2020-05-22 PROCEDURE — 99283 EMERGENCY DEPT VISIT LOW MDM: CPT

## 2020-05-22 RX ORDER — LIDOCAINE 4 G/100G
1 CREAM TOPICAL ONCE
Refills: 0 | Status: DISCONTINUED | OUTPATIENT
Start: 2020-05-22 | End: 2020-05-22

## 2020-05-22 RX ORDER — PENICILLIN V POTASSIUM 250 MG
1 TABLET ORAL
Qty: 10 | Refills: 0
Start: 2020-05-22

## 2020-05-22 RX ORDER — LIDOCAINE 4 G/100G
15 CREAM TOPICAL THREE TIMES A DAY
Refills: 0 | Status: DISCONTINUED | OUTPATIENT
Start: 2020-05-22 | End: 2020-05-26

## 2020-05-22 NOTE — ED PROVIDER NOTE - PATIENT PORTAL LINK FT
You can access the FollowMyHealth Patient Portal offered by [facility] by registering at the following website: [email or web address]. By joining [employer]’s FollowMyHealth portal, you will also be able to view your health information using other applications (apps) compatible with our system.

## 2020-05-22 NOTE — ED PROVIDER NOTE - CLINICAL SUMMARY MEDICAL DECISION MAKING FREE TEXT BOX
26 y/o F approx 27 week pregnant with no pertinent PMHx presents to ED with tooth pain in the number 17 tooth that was onset a week prior to visit. Plan to give penicillin, control pain local and systemically. Will advise follow up with dentist.

## 2020-05-22 NOTE — ED PROVIDER NOTE - TOOTH FINDINGS
normal shaped tooth, normal jaw movements, no pus, irritation on gums, tongue and base of mouth are nontender

## 2020-05-22 NOTE — ED PROVIDER NOTE - OBJECTIVE STATEMENT
26 y/o F approx 27 week pregnant with no pertinent PMHx presents to ED with tooth pain in the number 17 tooth that was onset a week prior to visit. Pt knows pain is related to an impacted wisdom tooth, but couldn't get it taken care of due to insurance reasons. She rates the pain 7/10 severity. Associated symptoms that the patient reports are swelling and irritation to the gums. Pt denies radiation of pain and fever. Pt is still able to eat food.

## 2020-05-22 NOTE — ED ADULT NURSE NOTE - OBJECTIVE STATEMENT
Receive pt. in Intake room 13 alert and oriented x 4, presenting to the ER with complaints of left wisdom tooth. Pt. is 27 weeks pregnant and stated " my wisdom tooth has been hurting I was told it was impacted few months ago, I receive antibiotics the pain went away but now it is hurting again". Pt. has no difficulty eating or swallowing, will continue to monitor.

## 2020-06-04 ENCOUNTER — APPOINTMENT (OUTPATIENT)
Dept: OBGYN | Facility: CLINIC | Age: 25
End: 2020-06-04
Payer: COMMERCIAL

## 2020-06-04 PROCEDURE — 0502F SUBSEQUENT PRENATAL CARE: CPT

## 2020-06-06 ENCOUNTER — TRANSCRIPTION ENCOUNTER (OUTPATIENT)
Age: 25
End: 2020-06-06

## 2020-06-27 NOTE — LACTATION INITIAL EVALUATION - LATCH: TYPE OF NIPPLE INFANT
Plan of care cont    Problem: Potential for Falls  Goal: Patient will remain free of falls  Description  INTERVENTIONS:  - Assess patient frequently for physical needs  -  Identify cognitive and physical deficits and behaviors that affect risk of falls    -  [place] fall precautions as indicated by assessment   - Educate patient/family on patient safety including physical limitations  - Instruct patient to call for assistance with activity based on assessment  - Modify environment to reduce risk of injury  - Consider OT/PT consult to assist with strengthening/mobility  Outcome: Progressing     Problem: PAIN - ADULT  Goal: Verbalizes/displays adequate comfort level or baseline comfort level  Description  Interventions:  - Encourage patient to monitor pain and request assistance  - Assess pain using appropriate pain scale  - Administer analgesics based on type and severity of pain and evaluate response  - Implement non-pharmacological measures as appropriate and evaluate response  - Consider cultural and social influences on pain and pain management  - Notify physician/advanced practitioner if interventions unsuccessful or patient reports new pain  Outcome: Progressing     Problem: INFECTION - ADULT  Goal: Absence or prevention of progression during hospitalization  Description  INTERVENTIONS:  - Assess and monitor for signs and symptoms of infection  - Monitor lab/diagnostic results  - Monitor all insertion sites, i e  indwelling lines, tubes, and drains  - Monitor endotracheal if appropriate and nasal secretions for changes in amount and color  - [place] appropriate cooling/warming therapies per order  - Administer medications as ordered  - Instruct and encourage patient and family to use good hand hygiene technique  - Identify and instruct in appropriate isolation precautions for identified infection/condition  Outcome: Progressing  Goal: Absence of fever/infection during neutropenic period  Description  INTERVENTIONS:  - Monitor WBC    Outcome: Progressing     Problem: SAFETY ADULT  Goal: Maintain or return to baseline ADL function  Description  INTERVENTIONS:  -  Assess patient's ability to carry out ADLs; assess patient's baseline for ADL function and identify physical deficits which impact ability to perform ADLs (bathing, care of mouth/teeth, toileting, grooming, dressing, etc )  - Assess/evaluate cause of self-care deficits   - Assess range of motion  - Assess patient's mobility; develop plan if impaired  - Assess patient's need for assistive devices and provide as appropriate  - Encourage maximum independence but intervene and supervise when necessary  - Involve family in performance of ADLs  - Assess for home care needs following discharge   - Consider OT consult to assist with ADL evaluation and planning for discharge  - Provide patient education as appropriate  Outcome: Progressing  Goal: Maintain or return mobility status to optimal level  Description  INTERVENTIONS:  - Assess patient's baseline mobility status (ambulation, transfers, stairs, etc )    - Identify cognitive and physical deficits and behaviors that affect mobility  - Identify mobility aids required to assist with transfers and/or ambulation (gait belt, sit-to-stand, lift, walker, cane, etc )  - [place] fall precautions as indicated by assessment  - Record patient progress and toleration of activity level on Mobility SBAR; progress patient to next Phase/Stage  - Instruct patient to call for assistance with activity based on assessment  - Consider rehabilitation consult to assist with strengthening/weightbearing, etc   Outcome: Progressing     Problem: DISCHARGE PLANNING  Goal: Discharge to home or other facility with appropriate resources  Description  INTERVENTIONS:  - Identify barriers to discharge w/patient and caregiver  - Arrange for needed discharge resources and transportation as appropriate  - Identify discharge learning needs (meds, wound care, etc )  - Arrange for interpretive services to assist at discharge as needed  - Refer to Case Management Department for coordinating discharge planning if the patient needs post-hospital services based on physician/advanced practitioner order or complex needs related to functional status, cognitive ability, or social support system  Outcome: Progressing     Problem: Knowledge Deficit  Goal: Patient/family/caregiver demonstrates understanding of disease process, treatment plan, medications, and discharge instructions  Description  Complete learning assessment and assess knowledge base    Interventions:  - Provide teaching at level of understanding  - Provide teaching via preferred learning methods  Outcome: Progressing     Problem: CARDIOVASCULAR - ADULT  Goal: Maintains optimal cardiac output and hemodynamic stability  Description  INTERVENTIONS:  - Monitor I/O, vital signs and rhythm  - Monitor for S/S and trends of decreased cardiac output  - Administer and titrate ordered vasoactive medications to optimize hemodynamic stability  - Assess quality of pulses, skin color and temperature  - Assess for signs of decreased coronary artery perfusion  - Instruct patient to report change in severity of symptoms  Outcome: Progressing  Goal: Absence of cardiac dysrhythmias or at baseline rhythm  Description  INTERVENTIONS:  - Continuous cardiac monitoring, vital signs, obtain 12 lead EKG if ordered  - Administer antiarrhythmic and heart rate control medications as ordered  - Monitor electrolytes and administer replacement therapy as ordered  Outcome: Progressing everted (after stimulation)

## 2020-07-01 ENCOUNTER — APPOINTMENT (OUTPATIENT)
Dept: OBGYN | Facility: CLINIC | Age: 25
End: 2020-07-01
Payer: COMMERCIAL

## 2020-07-01 PROCEDURE — 76816 OB US FOLLOW-UP PER FETUS: CPT

## 2020-07-16 ENCOUNTER — APPOINTMENT (OUTPATIENT)
Dept: OBGYN | Facility: CLINIC | Age: 25
End: 2020-07-16
Payer: COMMERCIAL

## 2020-07-16 PROCEDURE — 0502F SUBSEQUENT PRENATAL CARE: CPT

## 2020-07-22 ENCOUNTER — APPOINTMENT (OUTPATIENT)
Dept: OBGYN | Facility: CLINIC | Age: 25
End: 2020-07-22
Payer: COMMERCIAL

## 2020-07-22 PROCEDURE — 0502F SUBSEQUENT PRENATAL CARE: CPT

## 2020-07-28 ENCOUNTER — APPOINTMENT (OUTPATIENT)
Dept: OBGYN | Facility: CLINIC | Age: 25
End: 2020-07-28
Payer: COMMERCIAL

## 2020-07-28 PROCEDURE — 0502F SUBSEQUENT PRENATAL CARE: CPT

## 2020-08-05 ENCOUNTER — APPOINTMENT (OUTPATIENT)
Dept: OBGYN | Facility: CLINIC | Age: 25
End: 2020-08-05
Payer: COMMERCIAL

## 2020-08-05 PROCEDURE — 0502F SUBSEQUENT PRENATAL CARE: CPT

## 2020-08-06 ENCOUNTER — INPATIENT (INPATIENT)
Facility: HOSPITAL | Age: 25
LOS: 1 days | Discharge: HOME | End: 2020-08-08
Attending: SPECIALIST | Admitting: SPECIALIST
Payer: COMMERCIAL

## 2020-08-06 VITALS
TEMPERATURE: 98 F | DIASTOLIC BLOOD PRESSURE: 87 MMHG | HEART RATE: 70 BPM | SYSTOLIC BLOOD PRESSURE: 123 MMHG | RESPIRATION RATE: 18 BRPM

## 2020-08-06 DIAGNOSIS — O26.899 OTHER SPECIFIED PREGNANCY RELATED CONDITIONS, UNSPECIFIED TRIMESTER: ICD-10-CM

## 2020-08-06 DIAGNOSIS — Z3A.00 WEEKS OF GESTATION OF PREGNANCY NOT SPECIFIED: ICD-10-CM

## 2020-08-06 LAB
BASOPHILS # BLD AUTO: 0.03 K/UL — SIGNIFICANT CHANGE UP (ref 0–0.2)
BASOPHILS NFR BLD AUTO: 0.3 % — SIGNIFICANT CHANGE UP (ref 0–2)
BLD GP AB SCN SERPL QL: NEGATIVE — SIGNIFICANT CHANGE UP
EOSINOPHIL # BLD AUTO: 0.22 K/UL — SIGNIFICANT CHANGE UP (ref 0–0.5)
EOSINOPHIL NFR BLD AUTO: 1.9 % — SIGNIFICANT CHANGE UP (ref 0–6)
HCT VFR BLD CALC: 28.9 % — LOW (ref 34.5–45)
HGB BLD-MCNC: 8.6 G/DL — LOW (ref 11.5–15.5)
IMM GRANULOCYTES NFR BLD AUTO: 0.9 % — SIGNIFICANT CHANGE UP (ref 0–1.5)
LYMPHOCYTES # BLD AUTO: 2.39 K/UL — SIGNIFICANT CHANGE UP (ref 1–3.3)
LYMPHOCYTES # BLD AUTO: 20.8 % — SIGNIFICANT CHANGE UP (ref 13–44)
MCHC RBC-ENTMCNC: 22.5 PG — LOW (ref 27–34)
MCHC RBC-ENTMCNC: 29.8 % — LOW (ref 32–36)
MCV RBC AUTO: 75.5 FL — LOW (ref 80–100)
MONOCYTES # BLD AUTO: 0.65 K/UL — SIGNIFICANT CHANGE UP (ref 0–0.9)
MONOCYTES NFR BLD AUTO: 5.7 % — SIGNIFICANT CHANGE UP (ref 2–14)
NEUTROPHILS # BLD AUTO: 8.08 K/UL — HIGH (ref 1.8–7.4)
NEUTROPHILS NFR BLD AUTO: 70.4 % — SIGNIFICANT CHANGE UP (ref 43–77)
NRBC # FLD: 0.05 K/UL — SIGNIFICANT CHANGE UP (ref 0–0)
PLATELET # BLD AUTO: 250 K/UL — SIGNIFICANT CHANGE UP (ref 150–400)
PMV BLD: 12.3 FL — SIGNIFICANT CHANGE UP (ref 7–13)
RBC # BLD: 3.83 M/UL — SIGNIFICANT CHANGE UP (ref 3.8–5.2)
RBC # FLD: 15.2 % — HIGH (ref 10.3–14.5)
RH IG SCN BLD-IMP: POSITIVE — SIGNIFICANT CHANGE UP
WBC # BLD: 11.47 K/UL — HIGH (ref 3.8–10.5)
WBC # FLD AUTO: 11.47 K/UL — HIGH (ref 3.8–10.5)

## 2020-08-06 PROCEDURE — 59409 OBSTETRICAL CARE: CPT | Mod: U9,UB,GC

## 2020-08-06 RX ORDER — OXYTOCIN 10 UNIT/ML
333.33 VIAL (ML) INJECTION
Qty: 20 | Refills: 0 | Status: COMPLETED | OUTPATIENT
Start: 2020-08-06 | End: 2020-08-07

## 2020-08-06 RX ORDER — SODIUM CHLORIDE 9 MG/ML
1000 INJECTION, SOLUTION INTRAVENOUS
Refills: 0 | Status: DISCONTINUED | OUTPATIENT
Start: 2020-08-06 | End: 2020-08-07

## 2020-08-06 NOTE — CHART NOTE - NSCHARTNOTEFT_GEN_A_CORE
R2 Labor Note - DELAYED NOTE ENTRY 2/2 CLINICAL DUTIES - pt seen and examined at approx 9:15pm    S: Patient evaluated at bedside for cervical change. Pt reports feeling increasing pressure prior to sitting up for epidural.    O:  T(C): 36.9 (20 @ 19:25), Max: 36.9 (20 @ 19:25)  HR: 57 (20 @ 22:09) (53 - 93)  BP: 111/77 (20 @ 22:09) (111/77 - 132/69)  RR: 18 (20 @ 19:25) (18 - 18)  SpO2: 99% (20 @ 22:08) (89% - 100%)    SVE: /-1  EFM:   [place]:        A/P 25y P1 @38.2wks in labor. 4epi now.  -Cat 1 tracing  -EFW 3000g  -COVID pending  -Analgesia for epidural now.   -Anticipate     d/w [place] PGY3; tbd with service attendings.   Robyn Frankel PGY-2 R2 Labor Note - DELAYED NOTE ENTRY 2/2 CLINICAL DUTIES - pt seen and examined at approx 9:15pm    S: Patient evaluated at bedside for cervical change. Pt reports feeling increasing pressure prior to sitting up for epidural.    O:  T(C): 36.9 (20 @ 19:25), Max: 36.9 (20 @ 19:25)  HR: 57 (20 @ 22:09) (53 - 93)  BP: 111/77 (20 @ 22:09) (111/77 - 132/69)  RR: 18 (20 @ 19:25) (18 - 18)  SpO2: 99% (20 @ 22:08) (89% - 100%)    SVE: 6/80/-1  EFM: 140bpm, mod lalita, +accels, +variable decels  [place]: cx q2min      A/P 25y P1 @38.2wks in labor. 4epi now.  -Cat 2 tracing - continue resuscitation   -EFW 3000g  -COVID pending  -Analgesia for epidural now.   -Anticipate     d/w [place] PGY3; tbd with service attendings.   Robyn Frankel PGY-2

## 2020-08-06 NOTE — OB PROVIDER TRIAGE NOTE - HISTORY OF PRESENT ILLNESS
25y black female  at 38w2d presents to triage c/o strong uterine contractions  Reports +FM, no vaginal bleeding, no ROM or LOF  Prenatal care: Dr Brigida Calderon in [place]  GBS: Negative 20

## 2020-08-06 NOTE — OB PROVIDER TRIAGE NOTE - NSHPPHYSICALEXAM_GEN_ALL_CORE
T(C): 36.9 (08-06-20 @ 19:25), Max: 36.9 (08-06-20 @ 19:25)  HR: 70 (08-06-20 @ 19:25) (70 - 70)  BP: 123/87 (08-06-20 @ 19:29) (123/87 - 123/87)  RR: 18 (08-06-20 @ 19:25) (18 - 18)    Heart: RRR  Lungs: CTA  Abdomen: Gravid, soft, NT    NST: Reactive with moderate variability, Category 1 tracing  [place]: Regular contractions  VE: 5/90/-1, bulging membranes

## 2020-08-06 NOTE — OB RN DELIVERY SUMMARY - NS_SEPSISRSKCALC_OBGYN_ALL_OB_FT
EOS calculated successfully. EOS Risk Factor: 0.5/1000 live births ([facility] national incidence); GA=38w2d; Temp=98.5; ROM=0.05; GBS='Unknown'; Antibiotics='No antibiotics or any antibiotics < 2 hrs prior to birth' EOS calculated successfully. EOS Risk Factor: 0.5/1000 live births ([facility] national incidence); GA=38w2d; Temp=98.5; ROM=1.6; GBS='Unknown'; Antibiotics='No antibiotics or any antibiotics < 2 hrs prior to birth'

## 2020-08-06 NOTE — OB PROVIDER TRIAGE NOTE - PMH
Anemia    No pertinent past medical history    Spontaneous   x2 No D/C  Termination of pregnancy (fetus)  X2 1 D/C  Vaginal delivery   FT 2017 6#7

## 2020-08-06 NOTE — OB PROVIDER TRIAGE NOTE - NS_OBGYNHISTORY_OBGYN_ALL_OB_FT
GYN: Denies any h/o STDs, fibroids, ovarian Cyst, or abnormal pap smear  OBH: 2017 FT  6#7      TOP x 2      SAB x 2

## 2020-08-06 NOTE — OB PROVIDER TRIAGE NOTE - NSOBPROVIDERNOTE_OBGYN_ALL_OB_FT
25y black female  at 38w2d presents to triage c/o strong uterine contractions  Reports +FM, no vaginal bleeding, no ROM or LOF  Prenatal care: Dr Brigida Calderon in [place]  GBS: Negative 20    GYN: Denies any h/o STDs, fibroids, ovarian Cyst, or abnormal pap smear  OBH: 2017 FT  6#7      TOP x 2      SAB x 2  PAST MEDICAL & SURGICAL HISTORY: Denies    No Known Allergies    MEDICATIONS: PNV    T(C): 36.9 (20 @ 19:25), Max: 36.9 (20 @ 19:25)  HR: 70 (20 @ 19:25) (70 - 70)  BP: 123/87 (20 @ 19:29) (123/87 - 123/87)  RR: 18 (20 @ 19:25) (18 - 18)    Heart: RRR  Lungs: CTA  Abdomen: Gravid, soft, NT    NST: Reactive with moderate variability, Category 1 tracing  [place]: Regular contractions  VE: 5/90/-1, bulging membranes    A/P: 25y black female  at 38w2d in active labor  D/w Dr Davison  -Admit to labor and delivery  -Pain Management: Pt desires epidural  -Cont EFM/[place]  -Admission labs: CBC, RPR, T&S  -IV hydration  -Clear liquid diet

## 2020-08-07 ENCOUNTER — TRANSCRIPTION ENCOUNTER (OUTPATIENT)
Age: 25
End: 2020-08-07

## 2020-08-07 ENCOUNTER — APPOINTMENT (OUTPATIENT)
Dept: ORTHOPEDIC SURGERY | Facility: CLINIC | Age: 25
End: 2020-08-07
Payer: COMMERCIAL

## 2020-08-07 DIAGNOSIS — Z37.9 OUTCOME OF DELIVERY, UNSPECIFIED: ICD-10-CM

## 2020-08-07 LAB
HBV CORE AB SER-ACNC: NONREACTIVE — SIGNIFICANT CHANGE UP
HBV SURFACE AG SER-ACNC: NEGATIVE — SIGNIFICANT CHANGE UP
HIV COMBO RESULT: SIGNIFICANT CHANGE UP
HIV1+2 AB SPEC QL: SIGNIFICANT CHANGE UP
RUBV IGG SER-ACNC: 2 INDEX — SIGNIFICANT CHANGE UP
RUBV IGG SER-IMP: POSITIVE — SIGNIFICANT CHANGE UP
SARS-COV-2 RNA SPEC QL NAA+PROBE: SIGNIFICANT CHANGE UP
T PALLIDUM AB TITR SER: NEGATIVE — SIGNIFICANT CHANGE UP

## 2020-08-07 RX ORDER — OXYCODONE HYDROCHLORIDE 5 MG/1
5 TABLET ORAL
Refills: 0 | Status: DISCONTINUED | OUTPATIENT
Start: 2020-08-07 | End: 2020-08-08

## 2020-08-07 RX ORDER — DIBUCAINE 1 %
1 OINTMENT (GRAM) RECTAL EVERY 6 HOURS
Refills: 0 | Status: DISCONTINUED | OUTPATIENT
Start: 2020-08-07 | End: 2020-08-08

## 2020-08-07 RX ORDER — FERROUS SULFATE 325(65) MG
325 TABLET ORAL THREE TIMES A DAY
Refills: 0 | Status: DISCONTINUED | OUTPATIENT
Start: 2020-08-07 | End: 2020-08-08

## 2020-08-07 RX ORDER — ACETAMINOPHEN 500 MG
3 TABLET ORAL
Qty: 0 | Refills: 0 | DISCHARGE
Start: 2020-08-07

## 2020-08-07 RX ORDER — IBUPROFEN 200 MG
600 TABLET ORAL EVERY 6 HOURS
Refills: 0 | Status: DISCONTINUED | OUTPATIENT
Start: 2020-08-07 | End: 2020-08-08

## 2020-08-07 RX ORDER — KETOROLAC TROMETHAMINE 30 MG/ML
30 SYRINGE (ML) INJECTION ONCE
Refills: 0 | Status: DISCONTINUED | OUTPATIENT
Start: 2020-08-07 | End: 2020-08-07

## 2020-08-07 RX ORDER — PRAMOXINE HYDROCHLORIDE 150 MG/15G
1 AEROSOL, FOAM RECTAL EVERY 4 HOURS
Refills: 0 | Status: DISCONTINUED | OUTPATIENT
Start: 2020-08-07 | End: 2020-08-08

## 2020-08-07 RX ORDER — TETANUS TOXOID, REDUCED DIPHTHERIA TOXOID AND ACELLULAR PERTUSSIS VACCINE, ADSORBED 5; 2.5; 8; 8; 2.5 [IU]/.5ML; [IU]/.5ML; UG/.5ML; UG/.5ML; UG/.5ML
0.5 SUSPENSION INTRAMUSCULAR ONCE
Refills: 0 | Status: COMPLETED | OUTPATIENT
Start: 2020-08-07

## 2020-08-07 RX ORDER — IBUPROFEN 200 MG
600 TABLET ORAL EVERY 6 HOURS
Refills: 0 | Status: COMPLETED | OUTPATIENT
Start: 2020-08-07 | End: 2021-07-06

## 2020-08-07 RX ORDER — SODIUM CHLORIDE 9 MG/ML
3 INJECTION INTRAMUSCULAR; INTRAVENOUS; SUBCUTANEOUS EVERY 8 HOURS
Refills: 0 | Status: DISCONTINUED | OUTPATIENT
Start: 2020-08-07 | End: 2020-08-08

## 2020-08-07 RX ORDER — LANOLIN
1 OINTMENT (GRAM) TOPICAL EVERY 6 HOURS
Refills: 0 | Status: DISCONTINUED | OUTPATIENT
Start: 2020-08-07 | End: 2020-08-08

## 2020-08-07 RX ORDER — ACETAMINOPHEN 500 MG
975 TABLET ORAL
Refills: 0 | Status: DISCONTINUED | OUTPATIENT
Start: 2020-08-07 | End: 2020-08-08

## 2020-08-07 RX ORDER — SENNA PLUS 8.6 MG/1
1 TABLET ORAL
Refills: 0 | Status: DISCONTINUED | OUTPATIENT
Start: 2020-08-07 | End: 2020-08-08

## 2020-08-07 RX ORDER — HYDROCORTISONE 1 %
1 OINTMENT (GRAM) TOPICAL EVERY 6 HOURS
Refills: 0 | Status: DISCONTINUED | OUTPATIENT
Start: 2020-08-07 | End: 2020-08-08

## 2020-08-07 RX ORDER — DIPHENHYDRAMINE HCL 50 MG
25 CAPSULE ORAL EVERY 6 HOURS
Refills: 0 | Status: DISCONTINUED | OUTPATIENT
Start: 2020-08-07 | End: 2020-08-08

## 2020-08-07 RX ORDER — SIMETHICONE 80 MG/1
80 TABLET, CHEWABLE ORAL EVERY 4 HOURS
Refills: 0 | Status: DISCONTINUED | OUTPATIENT
Start: 2020-08-07 | End: 2020-08-08

## 2020-08-07 RX ORDER — OXYTOCIN 10 UNIT/ML
333.33 VIAL (ML) INJECTION
Qty: 20 | Refills: 0 | Status: DISCONTINUED | OUTPATIENT
Start: 2020-08-07 | End: 2020-08-07

## 2020-08-07 RX ORDER — BENZOCAINE 10 %
1 GEL (GRAM) MUCOUS MEMBRANE EVERY 6 HOURS
Refills: 0 | Status: DISCONTINUED | OUTPATIENT
Start: 2020-08-07 | End: 2020-08-08

## 2020-08-07 RX ORDER — MAGNESIUM HYDROXIDE 400 MG/1
30 TABLET, CHEWABLE ORAL
Refills: 0 | Status: DISCONTINUED | OUTPATIENT
Start: 2020-08-07 | End: 2020-08-08

## 2020-08-07 RX ORDER — CHOLECALCIFEROL (VITAMIN D3) 125 MCG
0 CAPSULE ORAL
Qty: 0 | Refills: 0 | DISCHARGE

## 2020-08-07 RX ORDER — AER TRAVELER 0.5 G/1
1 SOLUTION RECTAL; TOPICAL EVERY 4 HOURS
Refills: 0 | Status: DISCONTINUED | OUTPATIENT
Start: 2020-08-07 | End: 2020-08-08

## 2020-08-07 RX ORDER — OXYCODONE HYDROCHLORIDE 5 MG/1
5 TABLET ORAL ONCE
Refills: 0 | Status: DISCONTINUED | OUTPATIENT
Start: 2020-08-07 | End: 2020-08-08

## 2020-08-07 RX ORDER — ASCORBIC ACID 60 MG
500 TABLET,CHEWABLE ORAL DAILY
Refills: 0 | Status: DISCONTINUED | OUTPATIENT
Start: 2020-08-07 | End: 2020-08-08

## 2020-08-07 RX ADMIN — Medication 600 MILLIGRAM(S): at 18:04

## 2020-08-07 RX ADMIN — Medication 600 MILLIGRAM(S): at 17:13

## 2020-08-07 RX ADMIN — SODIUM CHLORIDE 3 MILLILITER(S): 9 INJECTION INTRAMUSCULAR; INTRAVENOUS; SUBCUTANEOUS at 05:42

## 2020-08-07 RX ADMIN — Medication 325 MILLIGRAM(S): at 11:42

## 2020-08-07 RX ADMIN — Medication 600 MILLIGRAM(S): at 06:31

## 2020-08-07 RX ADMIN — Medication 1000 MILLIUNIT(S)/MIN: at 02:00

## 2020-08-07 RX ADMIN — Medication 500 MILLIGRAM(S): at 11:42

## 2020-08-07 RX ADMIN — Medication 30 MILLIGRAM(S): at 02:25

## 2020-08-07 RX ADMIN — Medication 30 MILLIGRAM(S): at 01:00

## 2020-08-07 RX ADMIN — Medication 1000 MILLIUNIT(S)/MIN: at 04:00

## 2020-08-07 RX ADMIN — Medication 600 MILLIGRAM(S): at 07:00

## 2020-08-07 RX ADMIN — Medication 600 MILLIGRAM(S): at 11:42

## 2020-08-07 RX ADMIN — SODIUM CHLORIDE 3 MILLILITER(S): 9 INJECTION INTRAMUSCULAR; INTRAVENOUS; SUBCUTANEOUS at 13:41

## 2020-08-07 RX ADMIN — Medication 600 MILLIGRAM(S): at 12:30

## 2020-08-07 NOTE — OB PROVIDER H&P - HISTORY OF PRESENT ILLNESS
25y black female  at 38w2d presents to triage c/o strong uterine contractions  Reports +FM, no vaginal bleeding, no ROM or LOF  Prenatal care: Dr Brigida Calderon in [place]  GBS: Negative 20

## 2020-08-07 NOTE — DISCHARGE NOTE OB - COMMUNITY RESOURCE CONTACT NUMBER:
Patient will call to schedule baby's first visit appointment with pediatrician at [facility] [address], [facility] [phone] or at  [facility]: Division of General Pediatrics [address], [address] [place] [address]([phone]) so that baby is evaluated by pediatrician 1 to 2 days after hospital discharge.

## 2020-08-07 NOTE — DISCHARGE NOTE OB - CARE PLAN
Principal Discharge DX:	Vacuum-assisted vaginal delivery  Goal:	Full recovery  Assessment and plan of treatment:	24yo s/p VAVD stable during the postpartum period for discharge

## 2020-08-07 NOTE — OB PROVIDER DELIVERY SUMMARY - NSPROVIDERDELIVERYNOTE_OBGYN_ALL_OB_FT
Attending Attestation:  I was present with resident during the performance of the delivery and was directly involved in the management of the patient. I discussed the case with the resident and agree with the findings and plan as documented in the resident’s note.    Robin Davison M.D. Vacuum assisted vaginal delivery of live female infant. Decision made for vacuum delivery after bradycardia was seen when pushing. Patient was full dilated at +2 station. Kiwi was applied and one push and no pop-offs needed to deliver fetal head. Vacuum removed and head, shoulders and body delivered. Spontaneous cry and cord was clamped and cut. Infant was handed to peds. Fundal massage was given. Spontaneous delivery of intact placenta with 3 vessel cord. Fundus firm. First degree laceration noted and repaired with 2.0 chromic suture. Excellent hemostasis noted. EBL 250cc.       Attending Attestation:  I was present with resident during the performance of the delivery and was directly involved in the management of the patient. I discussed the case with the resident and agree with the findings and plan as documented in the resident’s note.    Robin Davison M.D.

## 2020-08-07 NOTE — OB PROVIDER DELIVERY SUMMARY - NSLACERATION_OBGYN_ALL_OB
Peripheral nerve/Neuraxial procedure note : intrathecal  Pre-Procedure  Performed by  YESENIA SEN   Location: OR    Procedure Times:2018 7:51 AM and 2018 7:56 AM  Pre-Anesthestic Checklist: patient identified, IV checked, site marked, risks and benefits discussed, informed consent, monitors and equipment checked, pre-op evaluation, at physician/surgeon's request and post-op pain management    Timeout  Correct Patient: Yes   Correct Procedure: Yes   Correct Site: Yes     Correct Position: Yes     .   Procedure Documentation  ASA 2  Diagnosis:.    Procedure:    Intrathecal.  Insertion Site:L3-4  (midline approach)      Patient Prep;chlorhexidine gluconate and isopropyl alcohol.  .  Needle: [place] tip Spinal Needle (gauge): 25  Spinal/LP Needle Length (inches): 3.5 # of attempts: 1 and # of redirects:  Introducer used Introducer: 20 G .       Assessment/Narrative  Paresthesias: No.  .  .  clear CSF fluid removed . Time Injected: 07:41  Sensory Level: T6             
Yes

## 2020-08-07 NOTE — DISCHARGE NOTE OB - MEDICATION SUMMARY - MEDICATIONS TO TAKE
I will START or STAY ON the medications listed below when I get home from the hospital:    acetaminophen 325 mg oral tablet  -- 3 tab(s) by mouth   -- Indication: For pain    Prenatal Multivitamins with Folic Acid 1 mg oral tablet  -- 1 tab(s) by mouth once a day  -- Indication: For Vitamins

## 2020-08-07 NOTE — DISCHARGE NOTE OB - COMMUNITY RESOURCE NAME:
As per patient's preference, patient will call to schedule postpartum follow up appointment for 4 to 6 weeks after delivery date at [facility] at [address] [address]  [phone]

## 2020-08-07 NOTE — DISCHARGE NOTE OB - PATIENT PORTAL LINK FT
You can access the FollowMyHealth Patient Portal offered by [facility] by registering at the following website: [email or web address]. By joining [facility]’s FollowMyHealth portal, you will also be able to view your health information using other applications (apps) compatible with our system.

## 2020-08-07 NOTE — OB PROVIDER H&P - ASSESSMENT
25y black female  at 38w2d presents to triage c/o strong uterine contractions  Reports +FM, no vaginal bleeding, no ROM or LOF  Prenatal care: Dr Brigida Calderon in [place]  GBS: Negative 20    GYN: Denies any h/o STDs, fibroids, ovarian Cyst, or abnormal pap smear  OBH: 2017 FT  6#7      TOP x 2      SAB x 2  PAST MEDICAL & SURGICAL HISTORY: Denies    No Known Allergies    MEDICATIONS: PNV    T(C): 36.9 (20 @ 19:25), Max: 36.9 (20 @ 19:25)  HR: 70 (20 @ 19:25) (70 - 70)  BP: 123/87 (20 @ 19:29) (123/87 - 123/87)  RR: 18 (20 @ 19:25) (18 - 18)    Heart: RRR  Lungs: CTA  Abdomen: Gravid, soft, NT    NST: Reactive with moderate variability, Category 1 tracing  [place]: Regular contractions  VE: 5/90/-1, bulging membranes    A/P: 25y black female  at 38w2d in active labor  D/w Dr Davison  -Admit to labor and delivery  -Pain Management: Pt desires epidural  -Cont EFM/[place]  -Admission labs: CBC, RPR, T&S  -IV hydration  -Clear liquid diet

## 2020-08-07 NOTE — OB PROVIDER H&P - PROBLEM SELECTOR PLAN 1
-Admit to labor and delivery  -Pain Management: Pt desires epidural  -Cont EFM/[place]  -Admission labs: CBC, RPR, T&S  -IV hydration  -Clear liquid diet

## 2020-08-07 NOTE — DISCHARGE NOTE OB - HOSPITAL COURSE
25y who experienced VAVD. Labor course was uncomplicated & delivery was uncomplicated. Postpartum course was unremarkable. Patient was transferred to postpartum floor & monitored. Pt was voiding spontaneously with normal vital signs. Patient is medically optimized for discharge & instructed to follow up with Dr. Calderon in 6 weeks for postpartum care.

## 2020-08-07 NOTE — OB NEONATOLOGY/PEDIATRICIAN DELIVERY SUMMARY - NSPEDSNEONOTESA_OBGYN_ALL_OB_FT
Baby is a   week GA   born to a    y/o G P  mother via  . Maternal history of anemia. Pregnancy uncomplicated. Maternal blood type B+ . Prenatal labs not available. GBS unknown . sROM < 18 hours with clear fluid. Baby born vigorous and crying spontaneously. Warmed, dried, stimulated. Apgars 8/9 . terminal meconium. EOS score 0.08 . Mom plans to breastfeed and consents hepB. Baby is a 38+2  week GA femal   born to a  24 y/o   mother via VAVD wioth cat 2 FHR tracing. Maternal history uncomplicated. Pregnancy uncomplicated. Maternal blood type . Prenatal labs not available. GBS unknown . sROM < 18 hours with clear fluid. Baby born vigorous and crying spontaneously. Warmed, dried, stimulated. Apgars 8/9 . terminal meconium. EOS score 0.08 . Mom plans to breastfeed and consents hepB.

## 2020-08-07 NOTE — DISCHARGE NOTE OB - ADDITIONAL INSTRUCTIONS
Make your follow-up appointment with your doctor in 6 weeks for a post-partum check. No heavy lifting, driving, or strenuous activity for 6 weeks. Nothing per vagina such as tampons, intercourse, douches, or tub baths for 6 weeks or until you see your doctor. Call your doctor with any signs and symptoms of infection such as fever, chills, nausea, or vomiting. Call your doctor if you're unable to tolerate food, increase in vaginal bleeding, or have difficulty urinating. Call your doctor if you have pain that is not relieved by your prescribed medications. Notify your doctor with any other concerns.   Call Dr. Calderon if you have any of these concerns in the next 6 weeks.

## 2020-08-07 NOTE — OB PROVIDER H&P - NSHPPHYSICALEXAM_GEN_ALL_CORE
T(C): 36.9 (08-06-20 @ 19:25), Max: 36.9 (08-06-20 @ 19:25)  HR: 70 (08-06-20 @ 19:25) (70 - 70)  BP: 123/87 (08-06-20 @ 19:29) (123/87 - 123/87)  RR: 18 (08-06-20 @ 19:25) (18 - 18)    Heart: RRR  Lungs: CTA  Abdomen: Gravid, soft, NT    NST: Reactive with moderate variability, Category 1 tracing  [place]: Regular contractions  VE: 5/90/-1, bulging membranes

## 2020-08-07 NOTE — DISCHARGE NOTE OB - CARE PROVIDER_API CALL
Brigida Calderon  OBSTETRICS AND GYNECOLOGY  [address] [place] [id number]  Phone: [phone]  Fax: [phone]  Follow Up Time:

## 2020-08-07 NOTE — PROGRESS NOTE ADULT - SUBJECTIVE AND OBJECTIVE BOX
OB Progress Note: VAVD PPD#1    S: 24yo PPD#1 s/p VAVD. Patient feels well. Pain is well controlled. She is tolerating a regular diet and passing flatus. She is voiding spontaneously, and ambulating without difficulty. Endorses light vaginal bleeding, soaking less than 1 pad/hour. Denies CP/SOB. Denies lightheadedness/dizziness. Denies N/V.     O:  Vitals:  Vital Signs Last 24 Hrs  T(C): 37.1 (07 Aug 2020 10:41), Max: 37.1 (07 Aug 2020 10:41)  T(F): 98.8 (07 Aug 2020 10:41), Max: 98.8 (07 Aug 2020 10:41)  HR: 72 (07 Aug 2020 10:41) (53 - 93)  BP: 106/64 (07 Aug 2020 10:41) (97/53 - 138/84)  BP(mean): --  RR: 17 (07 Aug 2020 10:41) (16 - 18)  SpO2: 99% (07 Aug 2020 10:41) (89% - 100%)    MEDICATIONS  (STANDING):  acetaminophen   Tablet .. 975 milliGRAM(s) Oral <User Schedule>  diphtheria/tetanus/pertussis (acellular) Vaccine (ADAcel) 0.5 milliLiter(s) IntraMuscular once  ibuprofen  Tablet. 600 milliGRAM(s) Oral every 6 hours  oxytocin Infusion 333.333 milliUNIT(s)/Min (1000 mL/Hr) IV Continuous <Continuous>  prenatal multivitamin 1 Tablet(s) Oral daily  sodium chloride 0.9% lock flush 3 milliLiter(s) IV Push every 8 hours      Labs:  Blood type: B Positive  Rubella IgG: RPR:                           8.6<L>   11.47<H> >-----------< 250    ( 08-06 @ 20:04 )             28.9<L>                  Physical Exam:  General: NAD  Heart: all extremities well perfused  Lungs: breathing comfortably  Abdomen: soft, non-tender, non-distended, fundus firm  Vaginal: lochia wnl  Extremities: No calf tenderness or erythema

## 2020-08-07 NOTE — PROGRESS NOTE ADULT - ATTENDING COMMENTS
I examined and evaluated the patient  I discussed the patient with the resident and agree with the resident's documentation with the following edits and additions:    24yo s/p VAVD  Offers no complaints   Has met all postpartum milestones  Isf remains stable may be discharged 24hours post delivery    YOANDY Mahan MD [facility]

## 2020-08-07 NOTE — DISCHARGE NOTE OB - MATERIALS PROVIDED
[facility] Hearing Screen Program/Guide to Postpartum Care Shaken Baby Prevention Handout/Guide to Postpartum Care/[facility] Hearing Screen Program

## 2020-08-07 NOTE — PROGRESS NOTE ADULT - PROBLEM SELECTOR PLAN 1
- Continue with po analgesia, pain well controlled  - Increase ambulation, SCDs when not ambulating  - Continue regular diet  - No evidence of ongoing bleeding    Janet Watts, PGY1

## 2020-08-08 VITALS
TEMPERATURE: 98 F | SYSTOLIC BLOOD PRESSURE: 100 MMHG | RESPIRATION RATE: 18 BRPM | DIASTOLIC BLOOD PRESSURE: 58 MMHG | OXYGEN SATURATION: 100 % | HEART RATE: 71 BPM

## 2020-08-08 LAB
HCT VFR BLD CALC: 25.4 % — LOW (ref 34.5–45)
HGB BLD-MCNC: 7.7 G/DL — LOW (ref 11.5–15.5)

## 2020-08-08 RX ORDER — TETANUS TOXOID, REDUCED DIPHTHERIA TOXOID AND ACELLULAR PERTUSSIS VACCINE, ADSORBED 5; 2.5; 8; 8; 2.5 [IU]/.5ML; [IU]/.5ML; UG/.5ML; UG/.5ML; UG/.5ML
0.5 SUSPENSION INTRAMUSCULAR ONCE
Refills: 0 | Status: COMPLETED | OUTPATIENT
Start: 2020-08-08 | End: 2020-08-08

## 2020-08-08 RX ADMIN — Medication 500 MILLIGRAM(S): at 14:17

## 2020-08-08 RX ADMIN — Medication 600 MILLIGRAM(S): at 01:20

## 2020-08-08 RX ADMIN — TETANUS TOXOID, REDUCED DIPHTHERIA TOXOID AND ACELLULAR PERTUSSIS VACCINE, ADSORBED 0.5 MILLILITER(S): 5; 2.5; 8; 8; 2.5 SUSPENSION INTRAMUSCULAR at 06:04

## 2020-08-08 RX ADMIN — Medication 1 TABLET(S): at 14:17

## 2020-08-08 RX ADMIN — Medication 600 MILLIGRAM(S): at 01:50

## 2020-08-08 RX ADMIN — Medication 600 MILLIGRAM(S): at 14:18

## 2020-08-08 RX ADMIN — Medication 600 MILLIGRAM(S): at 06:31

## 2020-08-08 RX ADMIN — Medication 600 MILLIGRAM(S): at 06:03

## 2020-08-08 RX ADMIN — Medication 325 MILLIGRAM(S): at 06:03

## 2020-08-08 RX ADMIN — Medication 600 MILLIGRAM(S): at 12:51

## 2020-08-08 NOTE — PROGRESS NOTE ADULT - SUBJECTIVE AND OBJECTIVE BOX
Attending Note     PPD 2 s/p VAVD   no complaints   pain well controlled  vaginal bleeding is ok     Vital Signs Last 24 Hrs  T(C): 36.6 (08 Aug 2020 05:56), Max: 37.1 (07 Aug 2020 10:41)  T(F): 97.9 (08 Aug 2020 05:56), Max: 98.8 (07 Aug 2020 10:41)  HR: 71 (08 Aug 2020 05:56) (55 - 72)  BP: 100/58 (08 Aug 2020 05:56) (100/58 - 122/80)  BP(mean): --  RR: 18 (08 Aug 2020 05:56) (17 - 18)  SpO2: 100% (08 Aug 2020 05:56) (99% - 100%)  Gen in NAD   CV RRR   Lungs CTA b/l   Abd soft, fundus firm   Perineum healing well   Ext no c/c/e     A/P: PPD 2 s/p VAVD doing well   routine pp care  d/c home today   discharge instructions sent home     SARAH Dias MD

## 2020-08-11 ENCOUNTER — APPOINTMENT (OUTPATIENT)
Dept: OBGYN | Facility: CLINIC | Age: 25
End: 2020-08-11

## 2020-08-11 PROBLEM — O03.9 COMPLETE OR UNSPECIFIED SPONTANEOUS ABORTION WITHOUT COMPLICATION: Chronic | Status: ACTIVE | Noted: 2020-08-06

## 2020-08-11 PROBLEM — D64.9 ANEMIA, UNSPECIFIED: Chronic | Status: ACTIVE | Noted: 2020-08-06

## 2020-08-11 PROBLEM — Z33.2 ENCOUNTER FOR ELECTIVE TERMINATION OF PREGNANCY: Chronic | Status: ACTIVE | Noted: 2020-08-06

## 2020-08-14 ENCOUNTER — APPOINTMENT (OUTPATIENT)
Dept: ORTHOPEDIC SURGERY | Facility: CLINIC | Age: 25
End: 2020-08-14
Payer: COMMERCIAL

## 2020-08-14 VITALS
HEIGHT: 63 IN | WEIGHT: 151 LBS | BODY MASS INDEX: 26.75 KG/M2 | DIASTOLIC BLOOD PRESSURE: 83 MMHG | HEART RATE: 52 BPM | TEMPERATURE: 98.1 F | SYSTOLIC BLOOD PRESSURE: 128 MMHG

## 2020-08-14 DIAGNOSIS — S46.911A STRAIN OF UNSPECIFIED MUSCLE, FASCIA AND TENDON AT SHOULDER AND UPPER ARM LEVEL, RIGHT ARM, INITIAL ENCOUNTER: ICD-10-CM

## 2020-08-14 DIAGNOSIS — S46.912A STRAIN OF UNSPECIFIED MUSCLE, FASCIA AND TENDON AT SHOULDER AND UPPER ARM LEVEL, LEFT ARM, INITIAL ENCOUNTER: ICD-10-CM

## 2020-08-14 PROCEDURE — 99203 OFFICE O/P NEW LOW 30 MIN: CPT

## 2020-08-14 PROCEDURE — 73030 X-RAY EXAM OF SHOULDER: CPT | Mod: LT

## 2020-08-19 ENCOUNTER — APPOINTMENT (OUTPATIENT)
Dept: OBGYN | Facility: CLINIC | Age: 25
End: 2020-08-19

## 2020-09-03 ENCOUNTER — APPOINTMENT (OUTPATIENT)
Dept: INTERNAL MEDICINE | Facility: CLINIC | Age: 25
End: 2020-09-03
Payer: COMMERCIAL

## 2020-09-03 VITALS
HEART RATE: 55 BPM | DIASTOLIC BLOOD PRESSURE: 80 MMHG | SYSTOLIC BLOOD PRESSURE: 120 MMHG | WEIGHT: 134 LBS | RESPIRATION RATE: 16 BRPM | BODY MASS INDEX: 23.74 KG/M2 | HEIGHT: 63 IN

## 2020-09-03 DIAGNOSIS — Z01.84 ENCOUNTER FOR ANTIBODY RESPONSE EXAMINATION: ICD-10-CM

## 2020-09-03 DIAGNOSIS — Z23 ENCOUNTER FOR IMMUNIZATION: ICD-10-CM

## 2020-09-03 PROCEDURE — 99395 PREV VISIT EST AGE 18-39: CPT | Mod: 25

## 2020-09-03 PROCEDURE — 36415 COLL VENOUS BLD VENIPUNCTURE: CPT

## 2020-09-03 PROCEDURE — G0008: CPT

## 2020-09-03 PROCEDURE — 90686 IIV4 VACC NO PRSV 0.5 ML IM: CPT

## 2020-09-03 PROCEDURE — 86580 TB INTRADERMAL TEST: CPT

## 2020-09-03 NOTE — PHYSICAL EXAM
[No Acute Distress] : no acute distress [Well Nourished] : well nourished [Well Developed] : well developed [Well-Appearing] : well-appearing [Normal Sclera/Conjunctiva] : normal sclera/conjunctiva [PERRL] : pupils equal round and reactive to light [EOMI] : extraocular movements intact [Normal Oropharynx] : the oropharynx was normal [Normal Outer Ear/Nose] : the outer ears and nose were normal in appearance [No JVD] : no jugular venous distention [Normal TMs] : both tympanic membranes were normal [Thyroid Normal, No Nodules] : the thyroid was normal and there were no nodules present [No Lymphadenopathy] : no lymphadenopathy [Supple] : supple [No Respiratory Distress] : no respiratory distress  [No Accessory Muscle Use] : no accessory muscle use [Clear to Auscultation] : lungs were clear to auscultation bilaterally [Normal Rate] : normal rate  [Regular Rhythm] : with a regular rhythm [Normal S1, S2] : normal S1 and S2 [No Murmur] : no murmur heard [No Carotid Bruits] : no carotid bruits [No Abdominal Bruit] : a ~M bruit was not heard ~T in the abdomen [Pedal Pulses Present] : the pedal pulses are present [No Edema] : there was no peripheral edema [No Palpable Aorta] : no palpable aorta [Soft] : abdomen soft [Non Tender] : non-tender [Non-distended] : non-distended [No Masses] : no abdominal mass palpated [No HSM] : no HSM [Normal Bowel Sounds] : normal bowel sounds [Normal Posterior Cervical Nodes] : no posterior cervical lymphadenopathy [Normal Anterior Cervical Nodes] : no anterior cervical lymphadenopathy [No CVA Tenderness] : no CVA  tenderness [No Joint Swelling] : no joint swelling [No Spinal Tenderness] : no spinal tenderness [Grossly Normal Strength/Tone] : grossly normal strength/tone [No Rash] : no rash [Coordination Grossly Intact] : coordination grossly intact [No Focal Deficits] : no focal deficits [Normal Gait] : normal gait [Normal Affect] : the affect was normal [Speech Grossly Normal] : speech grossly normal [Memory Grossly Normal] : memory grossly normal [Alert and Oriented x3] : oriented to person, place, and time [Normal Mood] : the mood was normal [Normal Insight/Judgement] : insight and judgment were intact

## 2020-09-03 NOTE — REVIEW OF SYSTEMS
[Negative] : Heme/Lymph [Itching] : no itching [Skin Rash] : no skin rash [de-identified] : concentration issues

## 2020-09-03 NOTE — HEALTH RISK ASSESSMENT
[No] : In the past 12 months have you used drugs other than those required for medical reasons? No [No falls in past year] : Patient reported no falls in the past year [0] : 2) Feeling down, depressed, or hopeless: Not at all (0) [None] : None [With Family] : lives with family [Employed] : employed [Student] : student [Fully functional (bathing, dressing, toileting, transferring, walking, feeding)] : Fully functional (bathing, dressing, toileting, transferring, walking, feeding) [Feels Safe at Home] : Feels safe at home [Fully functional (using the telephone, shopping, preparing meals, housekeeping, doing laundry, using] : Fully functional and needs no help or supervision to perform IADLs (using the telephone, shopping, preparing meals, housekeeping, doing laundry, using transportation, managing medications and managing finances) [Carbon Monoxide Detector] : carbon monoxide detector [Smoke Detector] : smoke detector [Seat Belt] :  uses seat belt [Sunscreen] : uses sunscreen [] : No [Audit-CScore] : 0 [CBC2Icvtu] : 0 [Change in mental status noted] : No change in mental status noted [Reports changes in hearing] : Reports no changes in hearing [Reports changes in vision] : Reports no changes in vision [Reports changes in dental health] : Reports no changes in dental health [PapSmearDate] : 8/20

## 2020-09-03 NOTE — HISTORY OF PRESENT ILLNESS
[FreeTextEntry1] : annual PE [de-identified] : 26 y/o female with a hx of lbp, dyspepsia, chr rhinitis\par Allergies controlled.    \par back has been improved - controlled.  s/p PT - doing stretches and exercises in the gym.\par Abd has been good.  \par ADHD meds have been on hold - had baby - breast feeding.  \par Saw ortho for shoulder injury.\par \par needs titers and ppd for form.\par \par Saw Gyn 8/20\par \par needs PPD.\par for flu vaccine\par tdap 8/820

## 2020-09-04 LAB
25(OH)D3 SERPL-MCNC: 18.2 NG/ML
ALBUMIN SERPL ELPH-MCNC: 4.3 G/DL
ALP BLD-CCNC: 80 U/L
ALT SERPL-CCNC: 18 U/L
ANION GAP SERPL CALC-SCNC: 12 MMOL/L
AST SERPL-CCNC: 24 U/L
BASOPHILS # BLD AUTO: 0.08 K/UL
BASOPHILS NFR BLD AUTO: 1.1 %
BILIRUB SERPL-MCNC: 0.2 MG/DL
BUN SERPL-MCNC: 9 MG/DL
CALCIUM SERPL-MCNC: 9.6 MG/DL
CHLORIDE SERPL-SCNC: 105 MMOL/L
CHOLEST SERPL-MCNC: 232 MG/DL
CHOLEST/HDLC SERPL: 2.6 RATIO
CO2 SERPL-SCNC: 22 MMOL/L
CREAT SERPL-MCNC: 0.69 MG/DL
EOSINOPHIL # BLD AUTO: 0.59 K/UL
EOSINOPHIL NFR BLD AUTO: 8.1 %
ESTIMATED AVERAGE GLUCOSE: 105 MG/DL
FERRITIN SERPL-MCNC: 17 NG/ML
GLUCOSE SERPL-MCNC: 79 MG/DL
HBA1C MFR BLD HPLC: 5.3 %
HCT VFR BLD CALC: 37.8 %
HDLC SERPL-MCNC: 90 MG/DL
HGB BLD-MCNC: 10.8 G/DL
IMM GRANULOCYTES NFR BLD AUTO: 0.1 %
IRON SATN MFR SERPL: 18 %
IRON SERPL-MCNC: 65 UG/DL
LDLC SERPL CALC-MCNC: 129 MG/DL
LYMPHOCYTES # BLD AUTO: 2.99 K/UL
LYMPHOCYTES NFR BLD AUTO: 41 %
MAN DIFF?: NORMAL
MCHC RBC-ENTMCNC: 23 PG
MCHC RBC-ENTMCNC: 28.6 GM/DL
MCV RBC AUTO: 80.6 FL
MEV IGG FLD QL IA: >300 AU/ML
MEV IGG+IGM SER-IMP: POSITIVE
MONOCYTES # BLD AUTO: 0.48 K/UL
MONOCYTES NFR BLD AUTO: 6.6 %
MUV AB SER-ACNC: POSITIVE
MUV IGG SER QL IA: 46.7 AU/ML
NEUTROPHILS # BLD AUTO: 3.15 K/UL
NEUTROPHILS NFR BLD AUTO: 43.1 %
PLATELET # BLD AUTO: 304 K/UL
POTASSIUM SERPL-SCNC: 3.9 MMOL/L
PROT SERPL-MCNC: 6.8 G/DL
RBC # BLD: 4.69 M/UL
RBC # FLD: 18.7 %
RUBV IGG FLD-ACNC: 2.3 INDEX
RUBV IGG SER-IMP: POSITIVE
SODIUM SERPL-SCNC: 139 MMOL/L
TIBC SERPL-MCNC: 366 UG/DL
TRIGL SERPL-MCNC: 67 MG/DL
TSH SERPL-ACNC: 0.91 UIU/ML
UIBC SERPL-MCNC: 301 UG/DL
WBC # FLD AUTO: 7.3 K/UL

## 2020-09-06 ENCOUNTER — APPOINTMENT (OUTPATIENT)
Dept: INTERNAL MEDICINE | Facility: CLINIC | Age: 25
End: 2020-09-06
Payer: COMMERCIAL

## 2020-09-06 PROCEDURE — ZZZZZ: CPT

## 2020-09-09 NOTE — ED PROVIDER NOTE - CROS ED NEURO ALL NEG
Spoke with patient and trauma team  Given overall clinical appearance and minimal opioid requirements will forego procedural interventions at this time      Dean Grady MD  Anesthesiology/Acute Pain
negative...

## 2020-09-17 ENCOUNTER — APPOINTMENT (OUTPATIENT)
Dept: OBGYN | Facility: CLINIC | Age: 25
End: 2020-09-17

## 2020-10-23 ENCOUNTER — APPOINTMENT (OUTPATIENT)
Dept: PSYCHIATRY | Facility: CLINIC | Age: 25
End: 2020-10-23
Payer: COMMERCIAL

## 2020-10-23 PROCEDURE — 99214 OFFICE O/P EST MOD 30 MIN: CPT

## 2020-10-23 PROCEDURE — 99072 ADDL SUPL MATRL&STAF TM PHE: CPT

## 2020-10-26 ENCOUNTER — APPOINTMENT (OUTPATIENT)
Dept: DERMATOLOGY | Facility: CLINIC | Age: 25
End: 2020-10-26
Payer: COMMERCIAL

## 2020-10-26 VITALS — BODY MASS INDEX: 23.04 KG/M2 | HEIGHT: 63 IN | WEIGHT: 130 LBS

## 2020-10-26 DIAGNOSIS — Z78.9 OTHER SPECIFIED HEALTH STATUS: ICD-10-CM

## 2020-10-26 DIAGNOSIS — L40.9 PSORIASIS, UNSPECIFIED: ICD-10-CM

## 2020-10-26 DIAGNOSIS — Z84.0 FAMILY HISTORY OF DISEASES OF THE SKIN AND SUBCUTANEOUS TISSUE: ICD-10-CM

## 2020-10-26 DIAGNOSIS — L29.9 PRURITUS, UNSPECIFIED: ICD-10-CM

## 2020-10-26 DIAGNOSIS — Z85.828 PERSONAL HISTORY OF OTHER MALIGNANT NEOPLASM OF SKIN: ICD-10-CM

## 2020-10-26 DIAGNOSIS — L30.9 DERMATITIS, UNSPECIFIED: ICD-10-CM

## 2020-10-26 PROCEDURE — 99202 OFFICE O/P NEW SF 15 MIN: CPT

## 2020-10-26 PROCEDURE — 99072 ADDL SUPL MATRL&STAF TM PHE: CPT

## 2020-10-26 NOTE — ED ADULT TRIAGE NOTE - PRO INTERPRETER NEED 2
Referral sent to [facility] via [employer] for review .    10/26/20 0819   Post-Acute Status   Post-Acute Authorization Placement   Post-Acute Placement Status Referrals Sent      English

## 2020-11-11 ENCOUNTER — APPOINTMENT (OUTPATIENT)
Dept: PSYCHIATRY | Facility: CLINIC | Age: 25
End: 2020-11-11
Payer: COMMERCIAL

## 2020-11-11 PROCEDURE — 99072 ADDL SUPL MATRL&STAF TM PHE: CPT

## 2020-11-11 PROCEDURE — 99214 OFFICE O/P EST MOD 30 MIN: CPT

## 2020-11-19 ENCOUNTER — APPOINTMENT (OUTPATIENT)
Dept: OBGYN | Facility: CLINIC | Age: 25
End: 2020-11-19

## 2020-12-07 ENCOUNTER — APPOINTMENT (OUTPATIENT)
Dept: DERMATOLOGY | Facility: CLINIC | Age: 25
End: 2020-12-07
Payer: COMMERCIAL

## 2020-12-07 PROCEDURE — 99214 OFFICE O/P EST MOD 30 MIN: CPT

## 2020-12-07 PROCEDURE — 99072 ADDL SUPL MATRL&STAF TM PHE: CPT

## 2020-12-07 RX ORDER — CLINDAMYCIN PHOSPHATE 10 MG/ML
1 LOTION TOPICAL DAILY
Qty: 1 | Refills: 3 | Status: ACTIVE | COMMUNITY
Start: 2020-12-07 | End: 1900-01-01

## 2020-12-07 RX ORDER — AZELAIC ACID 0.15 G/G
15 GEL TOPICAL TWICE DAILY
Qty: 1 | Refills: 3 | Status: ACTIVE | COMMUNITY
Start: 2020-12-07 | End: 1900-01-01

## 2020-12-10 ENCOUNTER — APPOINTMENT (OUTPATIENT)
Dept: ORTHOPEDIC SURGERY | Facility: CLINIC | Age: 25
End: 2020-12-10
Payer: COMMERCIAL

## 2020-12-10 VITALS
HEART RATE: 57 BPM | BODY MASS INDEX: 23.04 KG/M2 | WEIGHT: 130 LBS | SYSTOLIC BLOOD PRESSURE: 121 MMHG | HEIGHT: 63 IN | DIASTOLIC BLOOD PRESSURE: 82 MMHG

## 2020-12-10 VITALS — TEMPERATURE: 96.2 F

## 2020-12-10 DIAGNOSIS — M54.5 LOW BACK PAIN: ICD-10-CM

## 2020-12-10 PROCEDURE — 99072 ADDL SUPL MATRL&STAF TM PHE: CPT

## 2020-12-10 PROCEDURE — 99204 OFFICE O/P NEW MOD 45 MIN: CPT

## 2020-12-10 RX ORDER — DICLOFENAC SODIUM 75 MG/1
75 TABLET, DELAYED RELEASE ORAL
Qty: 30 | Refills: 1 | Status: ACTIVE | COMMUNITY
Start: 2020-12-10 | End: 1900-01-01

## 2021-01-12 RX ORDER — DEXTROAMPHETAMINE SULFATE, DEXTROAMPHETAMINE SACCHARATE, AMPHETAMINE SULFATE AND AMPHETAMINE ASPARTATE 6.25; 6.25; 6.25; 6.25 MG/1; MG/1; MG/1; MG/1
25 CAPSULE, EXTENDED RELEASE ORAL
Refills: 0 | Status: DISCONTINUED | COMMUNITY
End: 2021-01-12

## 2021-02-09 ENCOUNTER — APPOINTMENT (OUTPATIENT)
Dept: DERMATOLOGY | Facility: CLINIC | Age: 26
End: 2021-02-09

## 2021-05-14 ENCOUNTER — APPOINTMENT (OUTPATIENT)
Dept: PSYCHIATRY | Facility: CLINIC | Age: 26
End: 2021-05-14
Payer: MEDICAID

## 2021-05-14 DIAGNOSIS — Z87.59 PERSONAL HISTORY OF OTHER COMPLICATIONS OF PREGNANCY, CHILDBIRTH AND THE PUERPERIUM: ICD-10-CM

## 2021-05-14 PROCEDURE — 99072 ADDL SUPL MATRL&STAF TM PHE: CPT

## 2021-05-14 PROCEDURE — 99214 OFFICE O/P EST MOD 30 MIN: CPT

## 2021-06-18 NOTE — ED ADULT NURSE NOTE - PMH
Referred from [facility] urgent care to the ER for dyspnea for one week and presumed new onset atrial fibrillation. Referred from [facility] urgent care to the ER for dyspnea for one week and presumed new onset atrial fibrillation. No pertinent past medical history

## 2021-08-10 ENCOUNTER — APPOINTMENT (OUTPATIENT)
Dept: INTERNAL MEDICINE | Facility: CLINIC | Age: 26
End: 2021-08-10
Payer: MEDICAID

## 2021-08-10 ENCOUNTER — NON-APPOINTMENT (OUTPATIENT)
Age: 26
End: 2021-08-10

## 2021-08-10 VITALS
WEIGHT: 122 LBS | OXYGEN SATURATION: 98 % | DIASTOLIC BLOOD PRESSURE: 80 MMHG | RESPIRATION RATE: 16 BRPM | SYSTOLIC BLOOD PRESSURE: 120 MMHG | BODY MASS INDEX: 21.62 KG/M2 | HEIGHT: 63 IN | HEART RATE: 72 BPM

## 2021-08-10 DIAGNOSIS — H93.8X3 OTHER SPECIFIED DISORDERS OF EAR, BILATERAL: ICD-10-CM

## 2021-08-10 DIAGNOSIS — Z01.818 ENCOUNTER FOR OTHER PREPROCEDURAL EXAMINATION: ICD-10-CM

## 2021-08-10 PROCEDURE — 99395 PREV VISIT EST AGE 18-39: CPT | Mod: 25

## 2021-08-10 PROCEDURE — 93000 ELECTROCARDIOGRAM COMPLETE: CPT

## 2021-08-10 PROCEDURE — 86580 TB INTRADERMAL TEST: CPT

## 2021-08-10 NOTE — PHYSICAL EXAM
[Well Nourished] : well nourished [Well Developed] : well developed [Well-Appearing] : well-appearing [Normal Sclera/Conjunctiva] : normal sclera/conjunctiva [PERRL] : pupils equal round and reactive to light [EOMI] : extraocular movements intact [Normal Outer Ear/Nose] : the outer ears and nose were normal in appearance [Normal Oropharynx] : the oropharynx was normal [Normal TMs] : both tympanic membranes were normal [No JVD] : no jugular venous distention [No Lymphadenopathy] : no lymphadenopathy [Supple] : supple [No Respiratory Distress] : no respiratory distress  [Thyroid Normal, No Nodules] : the thyroid was normal and there were no nodules present [No Accessory Muscle Use] : no accessory muscle use [Clear to Auscultation] : lungs were clear to auscultation bilaterally [Normal Rate] : normal rate  [Regular Rhythm] : with a regular rhythm [Normal S1, S2] : normal S1 and S2 [No Murmur] : no murmur heard [No Carotid Bruits] : no carotid bruits [No Abdominal Bruit] : a ~M bruit was not heard ~T in the abdomen [Pedal Pulses Present] : the pedal pulses are present [No Edema] : there was no peripheral edema [No Palpable Aorta] : no palpable aorta [Soft] : abdomen soft [Non Tender] : non-tender [Non-distended] : non-distended [No Masses] : no abdominal mass palpated [No HSM] : no HSM [Normal Bowel Sounds] : normal bowel sounds [Normal Posterior Cervical Nodes] : no posterior cervical lymphadenopathy [Normal Anterior Cervical Nodes] : no anterior cervical lymphadenopathy [No CVA Tenderness] : no CVA  tenderness [No Spinal Tenderness] : no spinal tenderness [No Joint Swelling] : no joint swelling [Grossly Normal Strength/Tone] : grossly normal strength/tone [No Rash] : no rash [Coordination Grossly Intact] : coordination grossly intact [No Focal Deficits] : no focal deficits [Normal Gait] : normal gait [Speech Grossly Normal] : speech grossly normal [Memory Grossly Normal] : memory grossly normal [Normal Affect] : the affect was normal [Alert and Oriented x3] : oriented to person, place, and time [Normal Mood] : the mood was normal [Normal Insight/Judgement] : insight and judgment were intact [No Acute Distress] : no acute distress [Normal Voice/Communication] : normal voice/communication

## 2021-08-10 NOTE — REVIEW OF SYSTEMS
[Negative] : Heme/Lymph [Itching] : no itching [Skin Rash] : no skin rash [de-identified] : concentration issues

## 2021-08-10 NOTE — HEALTH RISK ASSESSMENT
[No] : In the past 12 months have you used drugs other than those required for medical reasons? No [No falls in past year] : Patient reported no falls in the past year [0] : 2) Feeling down, depressed, or hopeless: Not at all (0) [PHQ-2 Negative - No further assessment needed] : PHQ-2 Negative - No further assessment needed [None] : None [With Family] : lives with family [Employed] : employed [Student] : student [Feels Safe at Home] : Feels safe at home [Fully functional (bathing, dressing, toileting, transferring, walking, feeding)] : Fully functional (bathing, dressing, toileting, transferring, walking, feeding) [Fully functional (using the telephone, shopping, preparing meals, housekeeping, doing laundry, using] : Fully functional and needs no help or supervision to perform IADLs (using the telephone, shopping, preparing meals, housekeeping, doing laundry, using transportation, managing medications and managing finances) [Smoke Detector] : smoke detector [Carbon Monoxide Detector] : carbon monoxide detector [Seat Belt] :  uses seat belt [Sunscreen] : uses sunscreen [] : No [Audit-CScore] : 0 [[id number]] : 0 [Change in mental status noted] : No change in mental status noted [Reports changes in hearing] : Reports no changes in hearing [Reports changes in vision] : Reports no changes in vision [Reports changes in dental health] : Reports no changes in dental health

## 2021-08-10 NOTE — HISTORY OF PRESENT ILLNESS
[FreeTextEntry1] : annual PE\par pre-op [de-identified] : 27 y/o female with a hx of lbp, dyspepsia, chr rhinitis\par Allergies controlled.    \par back has been improved - controlled.  s/p PT - doing stretches and exercises in the gym.  Has followed up with ortho since last time.  Note reviewed.\par Abd has been good.  \par ADHD meds have been restarted has followed up with  - notes reviewed.  \par Saw ortho for shoulder injury.\par Has seen derm since last time.  note reviewed.  \par She will be going for cosmetic surgery 8/23/21 - labs, EKG, CXR requested\par no CV sx.  Has been walking for exercise.  no issues with stairs and hills.  no hx of bleeding. bruising.  \par \par needs ppd for form.\par \par Saw Gyn 8/20 - has appt.\par \par needs PPD.\par gets flu vaccine\par tdap 8/20

## 2021-08-11 ENCOUNTER — NON-APPOINTMENT (OUTPATIENT)
Age: 26
End: 2021-08-11

## 2021-08-11 LAB
25(OH)D3 SERPL-MCNC: 22.9 NG/ML
ALBUMIN SERPL ELPH-MCNC: 4.2 G/DL
ALP BLD-CCNC: 61 U/L
ALT SERPL-CCNC: 10 U/L
ANION GAP SERPL CALC-SCNC: 11 MMOL/L
APPEARANCE: CLEAR
APTT BLD: 27.7 SEC
AST SERPL-CCNC: 17 U/L
BASOPHILS # BLD AUTO: 0.07 K/UL
BASOPHILS NFR BLD AUTO: 1.1 %
BILIRUB SERPL-MCNC: 0.2 MG/DL
BILIRUBIN URINE: NEGATIVE
BLOOD URINE: NEGATIVE
BUN SERPL-MCNC: 10 MG/DL
CALCIUM SERPL-MCNC: 9.1 MG/DL
CHLORIDE SERPL-SCNC: 107 MMOL/L
CHOLEST SERPL-MCNC: 170 MG/DL
CO2 SERPL-SCNC: 23 MMOL/L
COLOR: YELLOW
CREAT SERPL-MCNC: 0.72 MG/DL
EOSINOPHIL # BLD AUTO: 0.52 K/UL
EOSINOPHIL NFR BLD AUTO: 8.3 %
ESTIMATED AVERAGE GLUCOSE: 105 MG/DL
GLUCOSE QUALITATIVE U: NEGATIVE
GLUCOSE SERPL-MCNC: 94 MG/DL
HBA1C MFR BLD HPLC: 5.3 %
HCG SERPL-MCNC: 6 MIU/ML
HCT VFR BLD CALC: 36.8 %
HCV AB SER QL: NONREACTIVE
HCV S/CO RATIO: 0.11 S/CO
HDLC SERPL-MCNC: 74 MG/DL
HGB BLD-MCNC: 10.6 G/DL
HIV1+2 AB SPEC QL IA.RAPID: NONREACTIVE
IMM GRANULOCYTES NFR BLD AUTO: 0.2 %
INR PPP: 0.97 RATIO
IRON SATN MFR SERPL: 19 %
IRON SERPL-MCNC: 59 UG/DL
KETONES URINE: NEGATIVE
LDLC SERPL CALC-MCNC: 80 MG/DL
LEUKOCYTE ESTERASE URINE: NEGATIVE
LYMPHOCYTES # BLD AUTO: 2.35 K/UL
LYMPHOCYTES NFR BLD AUTO: 37.6 %
MAN DIFF?: NORMAL
MCHC RBC-ENTMCNC: 26.2 PG
MCHC RBC-ENTMCNC: 28.8 GM/DL
MCV RBC AUTO: 91.1 FL
MONOCYTES # BLD AUTO: 0.29 K/UL
MONOCYTES NFR BLD AUTO: 4.6 %
NEUTROPHILS # BLD AUTO: 3.01 K/UL
NEUTROPHILS NFR BLD AUTO: 48.2 %
NITRITE URINE: NEGATIVE
NONHDLC SERPL-MCNC: 96 MG/DL
PH URINE: 6
PLATELET # BLD AUTO: 355 K/UL
POTASSIUM SERPL-SCNC: 3.5 MMOL/L
PROT SERPL-MCNC: 6.5 G/DL
PROTEIN URINE: NORMAL
PT BLD: 11.5 SEC
RBC # BLD: 4.04 M/UL
RBC # FLD: 14.6 %
SODIUM SERPL-SCNC: 141 MMOL/L
SPECIFIC GRAVITY URINE: 1.03
TIBC SERPL-MCNC: 306 UG/DL
TRIGL SERPL-MCNC: 78 MG/DL
TSH SERPL-ACNC: 1 UIU/ML
UIBC SERPL-MCNC: 247 UG/DL
UROBILINOGEN URINE: NORMAL
WBC # FLD AUTO: 6.25 K/UL

## 2021-08-12 ENCOUNTER — APPOINTMENT (OUTPATIENT)
Dept: INTERNAL MEDICINE | Facility: CLINIC | Age: 26
End: 2021-08-12
Payer: MEDICAID

## 2021-08-12 PROCEDURE — 99212 OFFICE O/P EST SF 10 MIN: CPT

## 2021-08-12 NOTE — HISTORY OF PRESENT ILLNESS
[FreeTextEntry1] : PPD reading, lab review [de-identified] : Here for PPD check and for lab review.\par labs reviewed with the pt in detail - discussed vit D and HCG\par reports recent termination and mildly persistent HCG elevation.  has followed up with gyn.  \par Remains w/o sx.  \par discussed vit D supplementation.  \par disucssed f/u with gyn to confirm that she is not pregnant and to send clearance letter.  d/w pt not getting CXR if preg.  \par pt aware of med effects if preg.

## 2021-08-12 NOTE — PHYSICAL EXAM
[Normal] : affect was normal and insight and judgment were intact [de-identified] : PPd negative.

## 2021-08-12 NOTE — REVIEW OF SYSTEMS
[Negative] : Heme/Lymph [Itching] : no itching [Skin Rash] : no skin rash [de-identified] : concentration issues

## 2021-08-18 ENCOUNTER — TRANSCRIPTION ENCOUNTER (OUTPATIENT)
Age: 26
End: 2021-08-18

## 2021-08-19 ENCOUNTER — OUTPATIENT (OUTPATIENT)
Dept: OUTPATIENT SERVICES | Facility: HOSPITAL | Age: 26
LOS: 1 days | End: 2021-08-19
Payer: MEDICAID

## 2021-08-19 ENCOUNTER — APPOINTMENT (OUTPATIENT)
Dept: RADIOLOGY | Facility: IMAGING CENTER | Age: 26
End: 2021-08-19
Payer: MEDICAID

## 2021-08-19 DIAGNOSIS — Z01.818 ENCOUNTER FOR OTHER PREPROCEDURAL EXAMINATION: ICD-10-CM

## 2021-08-19 PROCEDURE — 71046 X-RAY EXAM CHEST 2 VIEWS: CPT

## 2021-08-19 PROCEDURE — 71046 X-RAY EXAM CHEST 2 VIEWS: CPT | Mod: 26

## 2021-10-02 ENCOUNTER — APPOINTMENT (OUTPATIENT)
Dept: DISASTER EMERGENCY | Facility: OTHER | Age: 26
End: 2021-10-02

## 2021-10-26 ENCOUNTER — NON-APPOINTMENT (OUTPATIENT)
Age: 26
End: 2021-10-26

## 2021-11-06 ENCOUNTER — APPOINTMENT (OUTPATIENT)
Dept: DISASTER EMERGENCY | Facility: OTHER | Age: 26
End: 2021-11-06

## 2021-11-08 ENCOUNTER — APPOINTMENT (OUTPATIENT)
Dept: PSYCHIATRY | Facility: CLINIC | Age: 26
End: 2021-11-08
Payer: MEDICAID

## 2021-11-08 PROCEDURE — 99214 OFFICE O/P EST MOD 30 MIN: CPT

## 2021-12-08 ENCOUNTER — APPOINTMENT (OUTPATIENT)
Dept: DERMATOLOGY | Facility: CLINIC | Age: 26
End: 2021-12-08
Payer: MEDICAID

## 2021-12-08 VITALS — BODY MASS INDEX: 23.74 KG/M2 | HEIGHT: 63 IN | WEIGHT: 134 LBS

## 2021-12-08 DIAGNOSIS — L21.9 SEBORRHEIC DERMATITIS, UNSPECIFIED: ICD-10-CM

## 2021-12-08 DIAGNOSIS — L70.0 ACNE VULGARIS: ICD-10-CM

## 2021-12-08 PROCEDURE — 99214 OFFICE O/P EST MOD 30 MIN: CPT

## 2021-12-08 RX ORDER — DOXYCYCLINE HYCLATE 100 MG/1
100 CAPSULE ORAL
Qty: 90 | Refills: 0 | Status: ACTIVE | COMMUNITY
Start: 2021-12-08 | End: 1900-01-01

## 2021-12-08 RX ORDER — KETOCONAZOLE 20.5 MG/ML
2 SHAMPOO, SUSPENSION TOPICAL
Qty: 1 | Refills: 11 | Status: ACTIVE | COMMUNITY
Start: 2020-10-26 | End: 1900-01-01

## 2021-12-08 RX ORDER — FLUOCINONIDE 0.5 MG/ML
0.05 SOLUTION TOPICAL
Qty: 1 | Refills: 2 | Status: ACTIVE | COMMUNITY
Start: 2020-10-26 | End: 1900-01-01

## 2021-12-19 ENCOUNTER — NON-APPOINTMENT (OUTPATIENT)
Age: 26
End: 2021-12-19

## 2021-12-20 ENCOUNTER — APPOINTMENT (OUTPATIENT)
Dept: DERMATOLOGY | Facility: CLINIC | Age: 26
End: 2021-12-20
Payer: MEDICAID

## 2021-12-20 ENCOUNTER — LABORATORY RESULT (OUTPATIENT)
Age: 26
End: 2021-12-20

## 2021-12-20 DIAGNOSIS — L21.9 SEBORRHEIC DERMATITIS, UNSPECIFIED: ICD-10-CM

## 2021-12-20 DIAGNOSIS — D48.5 NEOPLASM OF UNCERTAIN BEHAVIOR OF SKIN: ICD-10-CM

## 2021-12-20 PROCEDURE — 11104 PUNCH BX SKIN SINGLE LESION: CPT

## 2021-12-22 RX ORDER — TRETINOIN 0.5 MG/G
0.05 CREAM TOPICAL
Qty: 1 | Refills: 5 | Status: ACTIVE | COMMUNITY
Start: 2021-12-08

## 2021-12-29 ENCOUNTER — NON-APPOINTMENT (OUTPATIENT)
Age: 26
End: 2021-12-29

## 2022-01-10 ENCOUNTER — APPOINTMENT (OUTPATIENT)
Dept: DERMATOLOGY | Facility: CLINIC | Age: 27
End: 2022-01-10
Payer: MEDICAID

## 2022-01-10 DIAGNOSIS — L65.9 NONSCARRING HAIR LOSS, UNSPECIFIED: ICD-10-CM

## 2022-01-10 DIAGNOSIS — L64.9 ANDROGENIC ALOPECIA, UNSPECIFIED: ICD-10-CM

## 2022-01-10 DIAGNOSIS — L66.1 LICHEN PLANOPILARIS: ICD-10-CM

## 2022-01-10 PROCEDURE — 99214 OFFICE O/P EST MOD 30 MIN: CPT | Mod: 25

## 2022-01-10 PROCEDURE — 11900 INJECT SKIN LESIONS </W 7: CPT

## 2022-01-10 RX ORDER — DOXYCYCLINE HYCLATE 100 MG/1
100 CAPSULE ORAL
Qty: 180 | Refills: 0 | Status: ACTIVE | COMMUNITY
Start: 2022-01-10 | End: 1900-01-01

## 2022-01-10 RX ORDER — CLOBETASOL PROPIONATE 0.5 MG/ML
0.05 SOLUTION TOPICAL
Qty: 1 | Refills: 4 | Status: ACTIVE | COMMUNITY
Start: 2022-01-10 | End: 1900-01-01

## 2022-02-19 ENCOUNTER — NON-APPOINTMENT (OUTPATIENT)
Age: 27
End: 2022-02-19

## 2022-02-19 ENCOUNTER — EMERGENCY (EMERGENCY)
Facility: HOSPITAL | Age: 27
LOS: 0 days | Discharge: HOME | End: 2022-02-19
Attending: STUDENT IN AN ORGANIZED HEALTH CARE EDUCATION/TRAINING PROGRAM
Payer: MEDICAID

## 2022-02-19 VITALS
SYSTOLIC BLOOD PRESSURE: 131 MMHG | RESPIRATION RATE: 19 BRPM | HEIGHT: 63 IN | TEMPERATURE: 98 F | DIASTOLIC BLOOD PRESSURE: 89 MMHG | HEART RATE: 76 BPM | WEIGHT: 136.91 LBS | OXYGEN SATURATION: 99 %

## 2022-02-19 DIAGNOSIS — K08.89 OTHER SPECIFIED DISORDERS OF TEETH AND SUPPORTING STRUCTURES: ICD-10-CM

## 2022-02-19 LAB — HCG UR QL: NEGATIVE — SIGNIFICANT CHANGE UP

## 2022-02-19 PROCEDURE — 99283 EMERGENCY DEPT VISIT LOW MDM: CPT

## 2022-02-19 RX ORDER — OXYCODONE AND ACETAMINOPHEN 5; 325 MG/1; MG/1
1 TABLET ORAL ONCE
Refills: 0 | Status: DISCONTINUED | OUTPATIENT
Start: 2022-02-19 | End: 2022-02-19

## 2022-02-19 RX ORDER — OXYCODONE AND ACETAMINOPHEN 5; 325 MG/1; MG/1
1 TABLET ORAL
Qty: 12 | Refills: 0
Start: 2022-02-19 | End: 2022-02-21

## 2022-02-19 RX ADMIN — Medication 1 TABLET(S): at 21:59

## 2022-02-19 RX ADMIN — OXYCODONE AND ACETAMINOPHEN 1 TABLET(S): 5; 325 TABLET ORAL at 21:59

## 2022-02-19 NOTE — ED PROVIDER NOTE - OBJECTIVE STATEMENT
26 y/o F with PMHx of  and Anemia presents to the ED c/o toothache since x3 days ago but worsened last night. As per pt, was told she needs a root canal but next available appt was next Thursday so pt was told to come to the hospital if her pain got worse. Pt was not given Abx and has been taking Tylenol, Ibuprofen, using Orajel with no relief. Endorses active bleeding from tooth, difficulty eating, sensitivity to cold but denies fever/chills or drainage. Pt is fully vaccinated against COVID. Patient denies EtOH/tobacco/illicit substance use. NKDA and reports she is taking Doxycycline b.i.d. LMP: 22. 28 y/o F with PMHx of Anemia presents to the ED c/o toothache since x3 days ago but worsened last night. As per pt, was told she needs a root canal but next available appt was this Thursday so pt was told to come to the hospital if her pain got worse. Pt was not given Abx and has been taking Tylenol, Ibuprofen, using Orajel and anbesol with no relief. Endorses active bleeding from tooth during brushing but otherwise no drainage, difficulty eating, sensitivity to cold but denies fever/chills or drainage. Pt is fully vaccinated against COVID. Patient denies EtOH/tobacco/illicit substance use. NKDA and reports she is taking Doxycycline b.i.d. LMP: 2/13/22.

## 2022-02-19 NOTE — ED PROVIDER NOTE - PHYSICAL EXAMINATION
Gen: no acute distress, well appearing, awake, alert and oriented x 3  Cardiac: regular rate and rhythm, +S1S2  Pulm: Clear to auscultation bilaterally  Abd: soft, nontender, nondistended, no guarding  Back: neg CVA ttp, nontender spine  Extremity: no edema, no deformity, warm and well perfused, FROM all extremities    Neuro: awake, alert, oriented x 3, sensorimotor intact Gen: no acute distress, well appearing, awake, alert and oriented x 3  Mouth: no gingival abscess, +tenderness to percussion left lower second molar   Cardiac: regular rate and rhythm, +S1S2  Pulm: Clear to auscultation bilaterally  Abd: soft, nontender, nondistended, no guarding

## 2022-02-19 NOTE — ED PROVIDER NOTE - NSICDXPASTMEDICALHX_GEN_ALL_CORE_FT
PAST MEDICAL HISTORY:  Anemia     No pertinent past medical history     Spontaneous  x2 No D/C    Termination of pregnancy (fetus) X2 1 D/C    Vaginal delivery  FT 2017 6#7

## 2022-02-19 NOTE — ED PROVIDER NOTE - CLINICAL SUMMARY MEDICAL DECISION MAKING FREE TEXT BOX
Call and mail copy of results to patient.  Kidney, liver function normal.  B12 and thyroid nromal  Triglycerides are high, work on diet and exercise. A/P toothache - Labs, medicate, f/u with dental appt and call dentist asap

## 2022-02-19 NOTE — ED PROVIDER NOTE - NSFOLLOWUPCLINICS_GEN_ALL_ED_FT
[facility]  Dental  [address] [place] [id number]  Phone: [phone]  Fax:

## 2022-02-19 NOTE — ED ADULT TRIAGE NOTE - CHIEF COMPLAINT QUOTE
pt states "my dentist said I need a root canal and if my pain got any worse I should go to the hospital because of infection."

## 2022-02-19 NOTE — ED PROVIDER NOTE - PATIENT PORTAL LINK FT
You can access the FollowMyHealth Patient Portal offered by [facility] by registering at the following website: [email or web address]. By joining [employer]’s FollowMyHealth portal, you will also be able to view your health information using other applications (apps) compatible with our system.

## 2022-02-19 NOTE — ED ADULT NURSE NOTE - NSIMPLEMENTINTERV_GEN_ALL_ED
Implemented All Universal Safety Interventions:  [place] to call system. Call bell, personal items and telephone within reach. Instruct patient to call for assistance. Room bathroom lighting operational. Non-slip footwear when patient is off stretcher. Physically safe environment: no spills, clutter or unnecessary equipment. Stretcher in lowest position, wheels locked, appropriate side rails in place.

## 2022-02-19 NOTE — ED PROVIDER NOTE - NSFOLLOWUPINSTRUCTIONS_ED_ALL_ED_FT
Please return to Emergency Department immediately for any new, concerning, or worsening symptoms.   Please follow-up with Dentist as soon as possible  Please take prescriptions as discussed.

## 2022-02-19 NOTE — ED ADULT NURSE NOTE - OBJECTIVE STATEMENT
PT PRESENTED TO ER, AOX4 AND AMBULATORY, WITH COMPLAINTS OF TOOTHACHE. PT STATES THAT SHE HAS A DENTAL APPT APPROACHING BUT SHE HAD SEVERE PAIN AND [id number] TO BE SEEN IN THE ER.

## 2022-02-20 ENCOUNTER — EMERGENCY (EMERGENCY)
Facility: HOSPITAL | Age: 27
LOS: 1 days | Discharge: HOME | End: 2022-02-20
Admitting: EMERGENCY MEDICINE
Payer: MEDICAID

## 2022-02-20 VITALS
HEART RATE: 78 BPM | OXYGEN SATURATION: 100 % | SYSTOLIC BLOOD PRESSURE: 132 MMHG | DIASTOLIC BLOOD PRESSURE: 95 MMHG | TEMPERATURE: 98 F | RESPIRATION RATE: 16 BRPM | HEIGHT: 63 IN

## 2022-02-20 PROCEDURE — 99284 EMERGENCY DEPT VISIT MOD MDM: CPT

## 2022-02-20 NOTE — ED PROVIDER NOTE - CLINICAL SUMMARY MEDICAL DECISION MAKING FREE TEXT BOX
26 y/o female pmh anemia, p/w c/o left lower molar severe tooth pain x 4-5 days, was seen by a dentist 4 days ago told that she needs a root canal, has not been bale to get an appt, was in [facility] yesterday, was given pain meds and instructed to f/u with dental, however pain is severe despite tylneol/motrin/percoet, denies any HA, neck pain, cough, f/c/n/v/d, chest pain, sob, abdominal pain, urinary symptoms, numbness/weakness/tingling, recent travel, sick contact, social history  - on exam, no facial swelling/erytema  + percussion tenderness to left lower 2nd molar  will get a dental consult, reasses

## 2022-02-20 NOTE — ED PROVIDER NOTE - PROGRESS NOTE DETAILS
cuong zee: pt seen by dental, given dental block, will be d/c, c/w her pain meds and have her f/u with her appton thursday

## 2022-02-20 NOTE — ED PROVIDER NOTE - PHYSICAL EXAMINATION
dental: no swelling/fluctuance appreciated + percussion tenderness to left lower 2nd molar bernarda, no bleeding

## 2022-02-20 NOTE — ED PROVIDER NOTE - OBJECTIVE STATEMENT
28 y/o female pmh anemia, p/w c/o left lower molar severe tooth pain x 4-5 days, was seen by a dentist 4 days ago told that she needs a root canal, has not been bale to get an appt, was in [facility] yesterday, was given pain meds and instructed to f/u with dental, however pain is severe despite tylneol/motrin/percoet, denies any HA, neck pain, cough, f/c/n/v/d, chest pain, sob, abdominal pain, urinary symptoms, numbness/weakness/tingling, recent travel, sick contact, social history

## 2022-02-20 NOTE — PROGRESS NOTE ADULT - SUBJECTIVE AND OBJECTIVE BOX
Patient is a 27y old  Female who presents with a chief complaint of toothache in lower left quadrant.     HPI: Pt points to #18 and states that she has been in excruciating pain since last night, for which she went to [facility] and was given percocet, tylenol, and amoxicillin for which she states no relief. Pt was last seen at dentist last Wednesday who recommended to have root canal done on tooth #18. Pt is scheduled to see dentist to start root canal on Thursday.       PAST MEDICAL & SURGICAL HISTORY:  No pertinent past medical history    Anemia    Vaginal delivery   FT 2017 6#7    Spontaneous   x2 No D/C    Termination of pregnancy (fetus)  X2 1 D/C    No significant past surgical history        MEDICATIONS  (STANDING):    MEDICATIONS  (PRN):      Allergies    No Known Allergies    Intolerances            *SOCIAL HISTORY: (guardian or who pt came with), (smoking hx)      Vital Signs Last 24 Hrs  T(C): 36.6 (2022 19:42), Max: 36.6 (2022 19:42)  T(F): 97.9 (2022 19:42), Max: 97.9 (2022 19:42)  HR: 78 (2022 19:42) (78 - 78)  BP: 132/95 (2022 19:42) (132/95 - 132/95)  BP(mean): --  RR: 16 (2022 19:42) (16 - 16)  SpO2: 100% (2022 19:42) (100% - 100%)    EOE:  TMJ WNL             Mandible FROM             Facial bones and MOM grossly intact             ( -  ) trismus             (  - ) LAD             (  - ) swelling             (  - ) asymmetry             (  - ) palpation             (  - ) SOB             (  - ) dysphagia             (  - ) LOC    IOE:  permanent dentition: grossly intact           hard/soft palate:  WNL           tongue/FOM WNL           labial/buccal mucosa WNL           (  + ) percussion           (  + ) palpation           ( -  ) swelling   #18 O has temporary restoration material     Mobility: none      *DENTAL RADIOGRAPHS: PA of #18 taken and interpreted: #18 O temporary restoration material; no caries or other abnormalities noted    ASSESSMENT: #18 symptomatic apical periodontitis    PROCEDURE:  Verbal consent given. Left JESUSITA block administered with 2 carpules of 0.5% marcaine with 1:200k epinephrine. Pt tolerated procedure well.     RECOMMENDATIONS:  1) Pain meds as per med team.  2) Dental F/U with outpatient dentist for comprehensive dental care.   3) If any difficulty swallowing/breathing, fever occur, page dental.     Doretha Gómez DMD, #[id number]

## 2022-02-20 NOTE — ED PROVIDER NOTE - PATIENT PORTAL LINK FT
You can access the FollowMyHealth Patient Portal offered by [facility] by registering at the following website: [email or web address]. By joining [employer]’s FollowMyHealth portal, you will also be able to view your health information using other applications (apps) compatible with our system.

## 2022-02-20 NOTE — ED ADULT TRIAGE NOTE - CHIEF COMPLAINT QUOTE
Pt presents to ED ambulatory from home with c/o L sided dental pain. Pt was unable to follow up with dentist yet. Pt was seen at another facility yesterday with no relief from pain medications.

## 2022-02-21 ENCOUNTER — NON-APPOINTMENT (OUTPATIENT)
Age: 27
End: 2022-02-21

## 2022-02-21 NOTE — OB RN PATIENT PROFILE - NS PRO TALK SOMEONE YN
unable to assess Alert-The patient is alert, awake and responds to voice. The patient is oriented to time, place, and person. The triage nurse is able to obtain subjective information.

## 2022-02-28 ENCOUNTER — APPOINTMENT (OUTPATIENT)
Dept: DERMATOLOGY | Facility: CLINIC | Age: 27
End: 2022-02-28

## 2022-03-13 NOTE — ED ADULT NURSE NOTE - SPO2 (%)
FOCUS OF CARE/SKILLED NEED: wound care, s/s of infection    TEACHING/INTERVENTIONS: wound care, s/s of infection    PROGRESS TOWARD GOALS: wounds are healing without s/s of infection    PHYSICIAN CONTACT: none    FALLS SINCE LAST VISIT?  none    MEDICATION CHANGES SINCE LAST VISIT?  none    PLAN FOR NEXT VISIT:  wuond care, s/s of infection 100

## 2022-04-01 ENCOUNTER — EMERGENCY (EMERGENCY)
Facility: HOSPITAL | Age: 27
LOS: 1 days | Discharge: HOME | End: 2022-04-01
Attending: EMERGENCY MEDICINE
Payer: MEDICAID

## 2022-04-01 ENCOUNTER — NON-APPOINTMENT (OUTPATIENT)
Age: 27
End: 2022-04-01

## 2022-04-01 VITALS
TEMPERATURE: 99 F | HEART RATE: 76 BPM | SYSTOLIC BLOOD PRESSURE: 117 MMHG | OXYGEN SATURATION: 98 % | RESPIRATION RATE: 16 BRPM | DIASTOLIC BLOOD PRESSURE: 78 MMHG

## 2022-04-01 VITALS
DIASTOLIC BLOOD PRESSURE: 83 MMHG | TEMPERATURE: 98 F | RESPIRATION RATE: 18 BRPM | SYSTOLIC BLOOD PRESSURE: 126 MMHG | HEIGHT: 63 IN | OXYGEN SATURATION: 99 % | WEIGHT: 143.96 LBS | HEART RATE: 83 BPM

## 2022-04-01 DIAGNOSIS — R10.11 RIGHT UPPER QUADRANT PAIN: ICD-10-CM

## 2022-04-01 DIAGNOSIS — R30.0 DYSURIA: ICD-10-CM

## 2022-04-01 DIAGNOSIS — N12 TUBULO-INTERSTITIAL NEPHRITIS, NOT SPECIFIED AS ACUTE OR CHRONIC: ICD-10-CM

## 2022-04-01 DIAGNOSIS — N83.201 UNSPECIFIED OVARIAN CYST, RIGHT SIDE: ICD-10-CM

## 2022-04-01 DIAGNOSIS — N39.0 URINARY TRACT INFECTION, SITE NOT SPECIFIED: ICD-10-CM

## 2022-04-01 DIAGNOSIS — R11.0 NAUSEA: ICD-10-CM

## 2022-04-01 LAB
ALBUMIN SERPL ELPH-MCNC: 3.8 G/DL — SIGNIFICANT CHANGE UP (ref 3.3–5)
ALP SERPL-CCNC: 58 U/L — SIGNIFICANT CHANGE UP (ref 40–120)
ALT FLD-CCNC: 21 U/L — SIGNIFICANT CHANGE UP (ref 12–78)
ANION GAP SERPL CALC-SCNC: 5 MMOL/L — SIGNIFICANT CHANGE UP (ref 5–17)
APPEARANCE UR: ABNORMAL
AST SERPL-CCNC: 22 U/L — SIGNIFICANT CHANGE UP (ref 15–37)
BACTERIA # UR AUTO: ABNORMAL
BASOPHILS # BLD AUTO: 0.08 K/UL — SIGNIFICANT CHANGE UP (ref 0–0.2)
BASOPHILS NFR BLD AUTO: 0.5 % — SIGNIFICANT CHANGE UP (ref 0–2)
BILIRUB SERPL-MCNC: 0.4 MG/DL — SIGNIFICANT CHANGE UP (ref 0.2–1.2)
BILIRUB UR-MCNC: ABNORMAL
BUN SERPL-MCNC: 12 MG/DL — SIGNIFICANT CHANGE UP (ref 7–23)
CALCIUM SERPL-MCNC: 9.2 MG/DL — SIGNIFICANT CHANGE UP (ref 8.5–10.1)
CHLORIDE SERPL-SCNC: 110 MMOL/L — HIGH (ref 96–108)
CO2 SERPL-SCNC: 25 MMOL/L — SIGNIFICANT CHANGE UP (ref 22–31)
COLOR SPEC: SIGNIFICANT CHANGE UP
CREAT SERPL-MCNC: 0.7 MG/DL — SIGNIFICANT CHANGE UP (ref 0.5–1.3)
DIFF PNL FLD: ABNORMAL
EGFR: 121 ML/MIN/1.73M2 — SIGNIFICANT CHANGE UP
EOSINOPHIL # BLD AUTO: 0.31 K/UL — SIGNIFICANT CHANGE UP (ref 0–0.5)
EOSINOPHIL NFR BLD AUTO: 2 % — SIGNIFICANT CHANGE UP (ref 0–6)
EPI CELLS # UR: SIGNIFICANT CHANGE UP
GLUCOSE SERPL-MCNC: 71 MG/DL — SIGNIFICANT CHANGE UP (ref 70–99)
GLUCOSE UR QL: NEGATIVE MG/DL — SIGNIFICANT CHANGE UP
HCG SERPL-ACNC: <1 MIU/ML — SIGNIFICANT CHANGE UP
HCT VFR BLD CALC: 38.1 % — SIGNIFICANT CHANGE UP (ref 34.5–45)
HGB BLD-MCNC: 11.7 G/DL — SIGNIFICANT CHANGE UP (ref 11.5–15.5)
IMM GRANULOCYTES NFR BLD AUTO: 0.5 % — SIGNIFICANT CHANGE UP (ref 0–1.5)
KETONES UR-MCNC: NEGATIVE — SIGNIFICANT CHANGE UP
LEUKOCYTE ESTERASE UR-ACNC: ABNORMAL
LIDOCAIN IGE QN: 71 U/L — LOW (ref 73–393)
LYMPHOCYTES # BLD AUTO: 1.9 K/UL — SIGNIFICANT CHANGE UP (ref 1–3.3)
LYMPHOCYTES # BLD AUTO: 12.3 % — LOW (ref 13–44)
MCHC RBC-ENTMCNC: 26.1 PG — LOW (ref 27–34)
MCHC RBC-ENTMCNC: 30.7 G/DL — LOW (ref 32–36)
MCV RBC AUTO: 85 FL — SIGNIFICANT CHANGE UP (ref 80–100)
MONOCYTES # BLD AUTO: 0.68 K/UL — SIGNIFICANT CHANGE UP (ref 0–0.9)
MONOCYTES NFR BLD AUTO: 4.4 % — SIGNIFICANT CHANGE UP (ref 2–14)
NEUTROPHILS # BLD AUTO: 12.39 K/UL — HIGH (ref 1.8–7.4)
NEUTROPHILS NFR BLD AUTO: 80.3 % — HIGH (ref 43–77)
NITRITE UR-MCNC: POSITIVE
NRBC # BLD: 0 /100 WBCS — SIGNIFICANT CHANGE UP (ref 0–0)
PH UR: 6 — SIGNIFICANT CHANGE UP (ref 5–8)
PLATELET # BLD AUTO: 338 K/UL — SIGNIFICANT CHANGE UP (ref 150–400)
POTASSIUM SERPL-MCNC: 4.1 MMOL/L — SIGNIFICANT CHANGE UP (ref 3.5–5.3)
POTASSIUM SERPL-SCNC: 4.1 MMOL/L — SIGNIFICANT CHANGE UP (ref 3.5–5.3)
PROT SERPL-MCNC: 7.9 GM/DL — SIGNIFICANT CHANGE UP (ref 6–8.3)
PROT UR-MCNC: 100 MG/DL
RBC # BLD: 4.48 M/UL — SIGNIFICANT CHANGE UP (ref 3.8–5.2)
RBC # FLD: 14.1 % — SIGNIFICANT CHANGE UP (ref 10.3–14.5)
RBC CASTS # UR COMP ASSIST: ABNORMAL /HPF (ref 0–4)
SODIUM SERPL-SCNC: 140 MMOL/L — SIGNIFICANT CHANGE UP (ref 135–145)
SP GR SPEC: 1.01 — SIGNIFICANT CHANGE UP (ref 1.01–1.02)
UROBILINOGEN FLD QL: 12 MG/DL
WBC # BLD: 15.44 K/UL — HIGH (ref 3.8–10.5)
WBC # FLD AUTO: 15.44 K/UL — HIGH (ref 3.8–10.5)
WBC UR QL: ABNORMAL

## 2022-04-01 PROCEDURE — 74176 CT ABD & PELVIS W/O CONTRAST: CPT | Mod: 26,MA

## 2022-04-01 PROCEDURE — 76856 US EXAM PELVIC COMPLETE: CPT | Mod: 26

## 2022-04-01 PROCEDURE — 99284 EMERGENCY DEPT VISIT MOD MDM: CPT

## 2022-04-01 RX ORDER — CEFPODOXIME PROXETIL 100 MG
1 TABLET ORAL
Qty: 14 | Refills: 0
Start: 2022-04-01 | End: 2022-04-07

## 2022-04-01 RX ORDER — CEFTRIAXONE 500 MG/1
1000 INJECTION, POWDER, FOR SOLUTION INTRAMUSCULAR; INTRAVENOUS ONCE
Refills: 0 | Status: COMPLETED | OUTPATIENT
Start: 2022-04-01 | End: 2022-04-01

## 2022-04-01 RX ORDER — ACETAMINOPHEN 500 MG
975 TABLET ORAL ONCE
Refills: 0 | Status: COMPLETED | OUTPATIENT
Start: 2022-04-01 | End: 2022-04-01

## 2022-04-01 RX ORDER — SODIUM CHLORIDE 9 MG/ML
1000 INJECTION INTRAMUSCULAR; INTRAVENOUS; SUBCUTANEOUS ONCE
Refills: 0 | Status: COMPLETED | OUTPATIENT
Start: 2022-04-01 | End: 2022-04-01

## 2022-04-01 RX ORDER — KETOROLAC TROMETHAMINE 30 MG/ML
30 SYRINGE (ML) INJECTION ONCE
Refills: 0 | Status: DISCONTINUED | OUTPATIENT
Start: 2022-04-01 | End: 2022-04-01

## 2022-04-01 RX ADMIN — SODIUM CHLORIDE 1000 MILLILITER(S): 9 INJECTION INTRAMUSCULAR; INTRAVENOUS; SUBCUTANEOUS at 14:20

## 2022-04-01 RX ADMIN — CEFTRIAXONE 100 MILLIGRAM(S): 500 INJECTION, POWDER, FOR SOLUTION INTRAMUSCULAR; INTRAVENOUS at 16:04

## 2022-04-01 RX ADMIN — Medication 30 MILLIGRAM(S): at 14:20

## 2022-04-01 RX ADMIN — Medication 975 MILLIGRAM(S): at 16:06

## 2022-04-01 RX ADMIN — SODIUM CHLORIDE 1000 MILLILITER(S): 9 INJECTION INTRAMUSCULAR; INTRAVENOUS; SUBCUTANEOUS at 16:07

## 2022-04-01 RX ADMIN — Medication 975 MILLIGRAM(S): at 13:39

## 2022-04-01 RX ADMIN — Medication 30 MILLIGRAM(S): at 16:07

## 2022-04-01 NOTE — ED PROVIDER NOTE - CLINICAL SUMMARY MEDICAL DECISION MAKING FREE TEXT BOX
Ddx: Pyelonephritis/ kidney stone/ no focal abd pain to suggest appendicitis  Plan: Cbc, cmp, CT abd, pain control, ua, ucx, hcg, reassess

## 2022-04-01 NOTE — ED PROVIDER NOTE - PATIENT PORTAL LINK FT
You can access the FollowMyHealth Patient Portal offered by [facility] by registering at the following website: [email or web address]. By joining [employer]’s FollowMyHealth portal, you will also be able to view your health information using other applications (apps) compatible with our system.

## 2022-04-01 NOTE — ED ADULT TRIAGE NOTE - CHIEF COMPLAINT QUOTE
A&Ox4, ambulating. p/w right flank pain since last night, no radiation, 8/10, burning with urination, urine frequency, hematuria. prescribed abt by teleheath by [facility]. denies fevers/chills/v

## 2022-04-01 NOTE — ED PROVIDER NOTE - CARE PROVIDER_API CALL
Gurwinder Soliz (DO)  Obstetrics and Gynecology  [address] [place] [address]  Phone: [phone]  Fax: [phone]  Follow Up Time: 4-6 Days

## 2022-04-01 NOTE — ED ADULT NURSE REASSESSMENT NOTE - NS ED NURSE REASSESS COMMENT FT1
Report received from DONNA Dunbar. Safety checks completed this shift. Safety rounds completed hourly.  NO IV. Fall & skin precautions in place. Pending discharge paperwork.  No acute distress noted. Will continue with plan of care.

## 2022-04-01 NOTE — ED PROVIDER NOTE - OBJECTIVE STATEMENT
Pt is a 28 yo lady with no significant past medical history who presents to the ED with R. flank pain since last night. Has had some dysuria, and was started on nitrofurantoin from telehealth visit today. Has nausea, no vomiting, or diarrhea or fever. No hx of kidney stones. No chest pain, no sob. LMP 3/6.

## 2022-04-03 LAB
CULTURE RESULTS: SIGNIFICANT CHANGE UP
SPECIMEN SOURCE: SIGNIFICANT CHANGE UP

## 2022-05-09 ENCOUNTER — APPOINTMENT (OUTPATIENT)
Dept: PSYCHIATRY | Facility: CLINIC | Age: 27
End: 2022-05-09
Payer: MEDICAID

## 2022-05-09 PROCEDURE — 99214 OFFICE O/P EST MOD 30 MIN: CPT

## 2022-07-12 NOTE — LACTATION INITIAL EVALUATION - BREAST TRAUMA OR BURNS
I personally reviewed the [place] prescription drug monitoring program online database. I refilled the prescription. Will try concerta 36. We can adjust dose depending on how he does with it. Try 30 day supply first as he has not been on it before. no

## 2022-08-04 ENCOUNTER — APPOINTMENT (OUTPATIENT)
Dept: PRIMARY CARE | Facility: HOSPITAL | Age: 27
End: 2022-08-04

## 2022-08-04 ENCOUNTER — OUTPATIENT (OUTPATIENT)
Dept: OUTPATIENT SERVICES | Facility: HOSPITAL | Age: 27
LOS: 1 days | End: 2022-08-04

## 2022-08-04 VITALS
OXYGEN SATURATION: 100 % | DIASTOLIC BLOOD PRESSURE: 74 MMHG | WEIGHT: 144 LBS | HEART RATE: 74 BPM | HEIGHT: 63 IN | SYSTOLIC BLOOD PRESSURE: 118 MMHG | TEMPERATURE: 97.8 F | BODY MASS INDEX: 25.52 KG/M2

## 2022-08-04 DIAGNOSIS — Z02.0 ENCOUNTER FOR EXAMINATION FOR ADMISSION TO EDUCATIONAL INSTITUTION: ICD-10-CM

## 2022-08-04 DIAGNOSIS — Z11.7 ENCOUNTER FOR TESTING FOR LATENT TUBERCULOSIS INFECTION: ICD-10-CM

## 2022-08-04 DIAGNOSIS — Z11.7 ENC FOR TESTING FOR LATENT TUBERCULOSIS INFECTION: ICD-10-CM

## 2022-08-04 NOTE — HISTORY OF PRESENT ILLNESS
[FreeTextEntry8] : 27-year-old female with no sig pmhx presents to [facility] for medical evaluation and clearance for school. 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 Patient has no complaints at this time- denies any fever, chills, HA, sore throat, cough, congestion, CP, palpitations, SOB, n/v/d, flank pain, or malaise.

## 2022-08-04 NOTE — PLAN
[FreeTextEntry1] : #Encounter for School Physical\par - Follow-up TB Quant\par - Completed physical examination form\par - Deemed medically cleared for school\par \par

## 2022-08-08 LAB
M TB IFN-G BLD-IMP: NEGATIVE
QUANTIFERON TB PLUS MITOGEN MINUS NIL: >10 IU/ML
QUANTIFERON TB PLUS NIL: 0.01 IU/ML
QUANTIFERON TB PLUS TB1 MINUS NIL: 0 IU/ML
QUANTIFERON TB PLUS TB2 MINUS NIL: 0 IU/ML

## 2022-08-24 ENCOUNTER — APPOINTMENT (OUTPATIENT)
Dept: INTERNAL MEDICINE | Facility: CLINIC | Age: 27
End: 2022-08-24

## 2022-11-01 ENCOUNTER — APPOINTMENT (OUTPATIENT)
Dept: PSYCHIATRY | Facility: CLINIC | Age: 27
End: 2022-11-01

## 2022-11-01 ENCOUNTER — EMERGENCY (EMERGENCY)
Facility: HOSPITAL | Age: 27
LOS: 0 days | Discharge: HOME | End: 2022-11-01
Attending: STUDENT IN AN ORGANIZED HEALTH CARE EDUCATION/TRAINING PROGRAM

## 2022-11-01 VITALS
HEIGHT: 63 IN | TEMPERATURE: 99 F | HEART RATE: 70 BPM | OXYGEN SATURATION: 98 % | WEIGHT: 151.9 LBS | DIASTOLIC BLOOD PRESSURE: 86 MMHG | SYSTOLIC BLOOD PRESSURE: 126 MMHG | RESPIRATION RATE: 18 BRPM

## 2022-11-01 VITALS
HEART RATE: 71 BPM | RESPIRATION RATE: 16 BRPM | OXYGEN SATURATION: 99 % | DIASTOLIC BLOOD PRESSURE: 80 MMHG | TEMPERATURE: 98 F | SYSTOLIC BLOOD PRESSURE: 115 MMHG

## 2022-11-01 DIAGNOSIS — Z86.2 PERSONAL HISTORY OF DISEASES OF THE BLOOD AND BLOOD-FORMING ORGANS AND CERTAIN DISORDERS INVOLVING THE IMMUNE MECHANISM: ICD-10-CM

## 2022-11-01 DIAGNOSIS — R30.0 DYSURIA: ICD-10-CM

## 2022-11-01 DIAGNOSIS — R10.30 LOWER ABDOMINAL PAIN, UNSPECIFIED: ICD-10-CM

## 2022-11-01 DIAGNOSIS — Z87.59 PERSONAL HISTORY OF OTHER COMPLICATIONS OF PREGNANCY, CHILDBIRTH AND THE PUERPERIUM: ICD-10-CM

## 2022-11-01 DIAGNOSIS — N39.0 URINARY TRACT INFECTION, SITE NOT SPECIFIED: ICD-10-CM

## 2022-11-01 LAB
APPEARANCE UR: ABNORMAL
BACTERIA # UR AUTO: ABNORMAL
BILIRUB UR-MCNC: ABNORMAL
COLOR SPEC: ABNORMAL
DIFF PNL FLD: ABNORMAL
EPI CELLS # UR: SIGNIFICANT CHANGE UP
GLUCOSE UR QL: NEGATIVE MG/DL — SIGNIFICANT CHANGE UP
KETONES UR-MCNC: ABNORMAL
LEUKOCYTE ESTERASE UR-ACNC: ABNORMAL
NITRITE UR-MCNC: POSITIVE
PH UR: 8 — SIGNIFICANT CHANGE UP (ref 5–8)
PROT UR-MCNC: 100 MG/DL
RBC CASTS # UR COMP ASSIST: ABNORMAL /HPF (ref 0–4)
SP GR SPEC: 1.01 — SIGNIFICANT CHANGE UP (ref 1.01–1.02)
UROBILINOGEN FLD QL: 12 MG/DL
WBC UR QL: ABNORMAL

## 2022-11-01 PROCEDURE — 99284 EMERGENCY DEPT VISIT MOD MDM: CPT

## 2022-11-01 RX ORDER — PHENAZOPYRIDINE HCL 100 MG
1 TABLET ORAL
Qty: 6 | Refills: 0
Start: 2022-11-01 | End: 2022-11-02

## 2022-11-01 RX ORDER — ACETAMINOPHEN 500 MG
975 TABLET ORAL ONCE
Refills: 0 | Status: COMPLETED | OUTPATIENT
Start: 2022-11-01 | End: 2022-11-01

## 2022-11-01 RX ORDER — CEFPODOXIME PROXETIL 100 MG
1 TABLET ORAL
Qty: 13 | Refills: 0
Start: 2022-11-01 | End: 2022-11-07

## 2022-11-01 RX ORDER — CEFPODOXIME PROXETIL 100 MG
200 TABLET ORAL ONCE
Refills: 0 | Status: COMPLETED | OUTPATIENT
Start: 2022-11-01 | End: 2022-11-01

## 2022-11-01 RX ADMIN — Medication 200 MILLIGRAM(S): at 06:08

## 2022-11-01 RX ADMIN — Medication 975 MILLIGRAM(S): at 06:08

## 2022-11-01 NOTE — ED PROVIDER NOTE - CLINICAL SUMMARY MEDICAL DECISION MAKING FREE TEXT BOX
Presenting with uti symptoms, had neg sti testing 1 week ago.  States c/w previous uti.  Well appearing, no systemic symptoms.  Likely dc with antibiotics.

## 2022-11-01 NOTE — ED PROVIDER NOTE - OBJECTIVE STATEMENT
26yo female with pmh anemia presenting with dysuria.  Has had this over past week and was treated with cephalexin by pmd but symptoms persisted and now worse.  At that time was tested for stds which was all negative.  Had + UA but issue with culture which never resulted.  No discharge, fevers, back pain.  Has crampy suprapubic pain.

## 2022-11-01 NOTE — ED ADULT NURSE NOTE - CAS EDP DISCH TYPE
"Chief Complaint   Patient presents with     cough, fever x 1 wk     refill all meds     Subjective:  24 y.o. male here with concerns of of cough.  Not productive.  No dyspnea.  Had fever.  Has rhinorrhea.  No ear pain or ST.    Also here for follow up on decreased appetite and sleep.  Has run out of mirtazapine and cyproheptadine.  Brother reports these were helpful to stimulate appetite and help sleep.  Pervasive developmental disorder.  Mostly mute and completely depended upon brother/family for ADLs.    Objective:  BP 90/60 (Patient Site: Right Arm, Patient Position: Sitting, Cuff Size: Adult Regular)  Pulse 83  Temp 98.2  F (36.8  C) (Oral)   Ht 5' 2.75\" (1.594 m)  Wt 103 lb (46.7 kg)  SpO2 100% Comment: room air  BMI 18.39 kg/m2  GENERAL: alert, not distressed  EARS: tympanic membranes normal, external auditory canals normal  NOSE: no rhinorrhea, nasal mucosa normal  PHARYNX: no erythema or exudates,   MOUTH: betel nut stains  FRONTAL and MAXILLARY SINUSES: non tender  NECK: no lymphadenopathy, nodules, or rigidity.  CHEST: clear, no rales, rhonchi, or wheezes  CARDIAC: regular without murmur  PSYCH: pleasant and follows directions, does not speak.    Assessment and Plan:  Insomnia  Decreased appetite  - mirtazapine (REMERON) 30 MG tablet; Take 1 tablet (30 mg total) by mouth at bedtime.  Dispense: 90 tablet; Refill: 1  - cyproheptadine (PERIACTIN) 4 mg tablet; Take 1 tablet (4 mg total) by mouth daily.  Dispense: 90 tablet; Refill: 1    Acute URI  Symptomatic therapy suggested: push fluids and maintain hydration, rest, and return office visit prn if symptoms persist or worsen. Lack of antibiotic effectiveness for viral illnesses discussed with brother with . Call or return to clinic prn if these symptoms worsen or fail to improve as anticipated.    - dextromethorphan-guaifenesin  mg/5 mL Liqd; Take 5 mL by mouth every 4 (four) hours as needed (cough).  Dispense: 120 mL; Refill: 1    Need " for influenza vaccination  Brother wished to have vaccine done today.  - Influenza, Seasonal Quad, Preservative Free 36+ Months        Home

## 2022-11-01 NOTE — ED ADULT NURSE NOTE - OBJECTIVE STATEMENT
covering for primary RN Abena, received pt in bed 9.5, ambulatory with steady gait, AOx4, here for urinary symptoms.  pt recently was started on cephalexin for 1 week for uti, however symptoms have not improved.  pt has hx of ADHD on adderall, birth control (xulane), anemia.  denies fever, n/v/d, chest pain, sob, urinary discharge.

## 2022-11-01 NOTE — ED PROVIDER NOTE - PATIENT PORTAL LINK FT
You can access the FollowMyHealth Patient Portal offered by [facility] by registering at the following website: [email or web address]. By joining [employer]’s FollowMyHealth portal, you will also be able to view your health information using other applications (apps) compatible with our system.
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
## 2022-11-01 NOTE — ED ADULT TRIAGE NOTE - CHIEF COMPLAINT QUOTE
complaining of dysuria, lower abdominal pain, nausea. h/o UTI finished antibiotic for 1 week, as per pt symptoms gets worse. denies fever, vomiting.

## 2022-11-01 NOTE — ED PROVIDER NOTE - PHYSICAL EXAMINATION
General appearance: Nontoxic appearing, conversant, afebrile    Eyes: anicteric sclerae, ELROY, EOMI   HENT: Atraumatic; oropharynx clear, MMM and no ulcerations, no pharyngeal erythema or exudate   Neck: Trachea midline; Full range of motion, supple   Pulm: normal respiratory effort and no intercostal retractions, normal work of breathing   Abdomen: Soft, non-tender, non-distended; no guarding or rebound   Back: No midline ttp, step offs, deformities, no cvat    Extremities: No peripheral edema, no gross deformities, FROM x4   Skin: Dry, normal temperature, turgor and texture; no rash   Psych: Appropriate affect, cooperative

## 2022-12-14 ENCOUNTER — APPOINTMENT (OUTPATIENT)
Dept: PSYCHIATRY | Facility: CLINIC | Age: 27
End: 2022-12-14

## 2022-12-14 PROCEDURE — 99214 OFFICE O/P EST MOD 30 MIN: CPT

## 2022-12-14 NOTE — DISCUSSION/SUMMARY
[FreeTextEntry1] : 27-year-old female with ADD depression.  Plan continue Wellbutrin 300 mg Adderall extended release 20 mg.  Follow-up in 6 months.

## 2022-12-14 NOTE — SOCIAL HISTORY
[FreeTextEntry1] : Patient grew up in [place].  She graduated from high school in 2013.  She was bullied in the seventh and eighth grade.  After that she had a good time in school she had a good group of friends once the bullying ended her grades picked up she was an average student she did a lot of dance during high school.  In high school she did not finish her homework she would turn in the assignments partially completed she had a difficult time paying attention in class.  She would have to sit in the front row she would draw pictures in her notebook or play with her phone.  She then went to [employer] but dropped out after year.  She then went to [employer] but dropped out after her semester.  She then went to [employer] but dropped out.  She then started [employer] and recently transferred to [employer] school.

## 2022-12-14 NOTE — FAMILY HISTORY
[FreeTextEntry1] : Patient born on [place].  Mother 52 is a director of a program.  Father 52 is a technician.  She has a brother 13 who has ADHD on Ritalin.  There is a maternal grandmother and a maternal cousin with depression there is a maternal uncle with alcohol abuse no abuse history growing up.

## 2022-12-14 NOTE — PAST MEDICAL HISTORY
[FreeTextEntry1] : Patient is never seen a psychiatrist or been on any psychiatric medication.  She has been in therapy in the past.

## 2022-12-14 NOTE — CURRENT PSYCHIATRIC SYMPTOMS
[Depressed Mood] : no depressed mood [Anhedonia] : no anhedonia [Decreased Concentration] : no decrease in concentrating ability [Panic] : no panic disorder [de-identified] : Denied [de-identified] : Denied [de-identified] : Denied [de-identified] : Denied [de-identified] : Denied [de-identified] : Denied [de-identified] : Denied
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
## 2023-01-01 NOTE — OB PROVIDER TRIAGE NOTE - NSPREVIOUSLIVEBIRTHSNOWLIVING_OBGYN_ALL_OB_NU
_       Intensive Care Daily Note   Advanced Practice     Born at 7 lb (3175 g) at Gestational Age: 39w2d and admitted to the NICU due to BHAVANI. She is now 41w0d.          Assessment and Plan:     Patient Active Problem List   Diagnosis     Single liveborn infant, delivered by            abstinence syndrome 0-28 days with withdrawal symptoms     [place] affected by breech presentation      abstinence symptoms     Lab test positive for detection of COVID-19 virus              Physical Exam:   Sleeping, startles with cares, goes back to sleep easily. Anterior fontanelle soft and flat. Sutures approximated. Breath sounds clear, no retractions. Mild nasal stuffiness. No murmur noted. Peripheral/femoral pulses and perfusion equal and brisk. Abdomen soft, non-distended. +BS. No masses or hepatosplenomegaly. Skin without lesions. Mildly increased tone with disturbed.     Parent Communication: Parents will be updated by team after rounds.   Extended Emergency Contact Information  Primary Emergency Contact: ELIZABETH CHAVEZ  Home Phone: [phone]  Relation: Mother          Cortney Christopher, EVITA- CNP, NNP 2023   Advanced Practice Service        1

## 2023-03-29 ENCOUNTER — EMERGENCY (EMERGENCY)
Facility: HOSPITAL | Age: 28
LOS: 1 days | Discharge: HOME | End: 2023-03-29
Payer: COMMERCIAL

## 2023-03-29 VITALS
HEART RATE: 74 BPM | HEIGHT: 63 IN | DIASTOLIC BLOOD PRESSURE: 85 MMHG | SYSTOLIC BLOOD PRESSURE: 118 MMHG | OXYGEN SATURATION: 98 % | TEMPERATURE: 99 F | RESPIRATION RATE: 17 BRPM | WEIGHT: 156.09 LBS

## 2023-03-29 PROCEDURE — 71046 X-RAY EXAM CHEST 2 VIEWS: CPT | Mod: 26

## 2023-03-29 PROCEDURE — 99284 EMERGENCY DEPT VISIT MOD MDM: CPT

## 2023-03-29 PROCEDURE — 99284 EMERGENCY DEPT VISIT MOD MDM: CPT | Mod: 25

## 2023-03-29 PROCEDURE — 71046 X-RAY EXAM CHEST 2 VIEWS: CPT

## 2023-03-29 PROCEDURE — 72100 X-RAY EXAM L-S SPINE 2/3 VWS: CPT | Mod: 26

## 2023-03-29 PROCEDURE — 72100 X-RAY EXAM L-S SPINE 2/3 VWS: CPT

## 2023-03-29 NOTE — ED PROVIDER NOTE - PATIENT PORTAL LINK FT
You can access the FollowMyHealth Patient Portal offered by [facility] by registering at the following website: [email or web address]. By joining [employer]’s FollowMyHealth portal, you will also be able to view your health information using other applications (apps) compatible with our system.
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
## 2023-03-29 NOTE — ED PROVIDER NOTE - PROGRESS NOTE DETAILS
DOWNTIME NOTE -- please see paper chart for medical chart.  --BMM Patito Fajardo PA-C: I was not involved in the care of this patient and am only entering information to back log for downtime. Please see paper chart for isolation and medical management details, as the mandatory fields in the disposition section may not be accurate.

## 2023-04-03 ENCOUNTER — APPOINTMENT (OUTPATIENT)
Dept: ULTRASOUND IMAGING | Facility: IMAGING CENTER | Age: 28
End: 2023-04-03
Payer: COMMERCIAL

## 2023-04-03 ENCOUNTER — OUTPATIENT (OUTPATIENT)
Dept: OUTPATIENT SERVICES | Facility: HOSPITAL | Age: 28
LOS: 1 days | End: 2023-04-03
Payer: COMMERCIAL

## 2023-04-03 DIAGNOSIS — Z00.8 ENCOUNTER FOR OTHER GENERAL EXAMINATION: ICD-10-CM

## 2023-04-03 PROCEDURE — 76641 ULTRASOUND BREAST COMPLETE: CPT | Mod: 26,50

## 2023-04-03 PROCEDURE — 76641 ULTRASOUND BREAST COMPLETE: CPT

## 2023-04-06 ENCOUNTER — APPOINTMENT (OUTPATIENT)
Dept: ORTHOPEDIC SURGERY | Facility: CLINIC | Age: 28
End: 2023-04-06

## 2023-07-03 ENCOUNTER — APPOINTMENT (OUTPATIENT)
Dept: PSYCHIATRY | Facility: CLINIC | Age: 28
End: 2023-07-03
Payer: SELF-PAY

## 2023-07-03 PROCEDURE — 99214 OFFICE O/P EST MOD 30 MIN: CPT

## 2023-07-03 RX ORDER — BUPROPION HYDROCHLORIDE 300 MG/1
300 TABLET, EXTENDED RELEASE ORAL
Qty: 90 | Refills: 1 | Status: ACTIVE | COMMUNITY
Start: 2020-10-23 | End: 1900-01-01
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
## 2023-07-03 NOTE — FAMILY HISTORY
[FreeTextEntry1] : Patient born on [place].  Mother 52 is a director of a program.  Father 52 is a technician.  She has a brother 13 who has ADHD on Ritalin.  There is a maternal grandmother and a maternal cousin with depression there is a maternal uncle with alcohol abuse no abuse history growing up.

## 2023-07-03 NOTE — DISCUSSION/SUMMARY
[FreeTextEntry1] : 28-year-old female with ADD depression.  Plan continue Wellbutrin 300 mg Adderall extended release 20 mg.  Follow-up in 6 months.
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
## 2023-07-03 NOTE — CURRENT PSYCHIATRIC SYMPTOMS
[Depressed Mood] : no depressed mood [Anhedonia] : no anhedonia [Decreased Concentration] : no decrease in concentrating ability [Panic] : no panic disorder [de-identified] : Denied [de-identified] : Denied [de-identified] : Denied [de-identified] : Denied [de-identified] : Denied [de-identified] : Denied [de-identified] : Denied

## 2023-07-03 NOTE — PHYSICAL EXAM
[None] : none [FreeTextEntry2] : Walking with a cane [Dysphoric] : not dysphoric [Normal] : normal [Fair] : fair

## 2023-07-06 NOTE — ED ADULT NURSE NOTE - NSFALLRSKOUTCOME_ED_ALL_ED
Universal Safety Interventions Solaraze Counseling:  I discussed with the patient the risks of Solaraze including but not limited to erythema, scaling, itching, weeping, crusting, and pain.

## 2023-07-30 NOTE — ED ADULT NURSE NOTE - AGENT'S NAME
39 yo female here for treatment of newly diagnosed [id number] AML with NORS-Umn-Mbf induction. Today is day 6  Final marrow studies are pending for karyotype.  TLC placed  MUGA EF 60%  Cont Zarxio  Venetoclax days 1-14  Cont neutropenic PPx w/ levaquin, posa  Transfuse for Hgb <7, plts <10 or <15 if febrile  Supportive care  Started on Provera for menses  Claritin for bone pain from zarxio, pain control Ivy Torres 41 yo female here for treatment of newly diagnosed [id number] AML with OJNS-Wqb-Bey induction. Today is day 7  Final marrow studies are pending for karyotype.  TLC placed  MUGA EF 60%  Cont Zarxio  Venetoclax days 1-14  Cont neutropenic PPx w/ levaquin, posa  Transfuse for Hgb <7, plts <10 or <15 if febrile  Supportive care  Started on Provera for menses  Claritin for bone pain from zarxio, pain control

## 2023-08-30 NOTE — ED PROVIDER NOTE - GASTROINTESTINAL NEGATIVE STATEMENT, MLM
What Type Of Note Output Would You Prefer (Optional)?: Bullet Format
What Is The Reason For Today's Visit?: Full Body Skin Examination
What Is The Reason For Today's Visit? (Being Monitored For X): surveillance against the recurrence of atypical nevi
no abdominal pain, no bloating, no constipation, no diarrhea, no nausea and no vomiting.

## 2023-09-08 NOTE — OB RN PATIENT PROFILE - EDUCATION PROVIDED ON ASSESSMENT OF INFANT "FEEDING CUES" AND THE IMPORTANCE OF FEEDING "ON CUE" / "BABY-LED" FEEDINGS
Date of Visit: 9/7/2023  Patient Name: Alireza Mortensen  Medical Record Number: [id number]  YOB: 2005   Primary Physician: Usman Daigle MD    Well Child Exam, 12-17 male  Alireza Mortensen is a 17 year old male who presents with with Mother.  Concerns:  On and off nasal congestion and hand sweating  There is no problem list on file for this patient.     Meds:   Outpatient Medications Marked as Taking for the 9/7/23 encounter (Office Visit) with Usman Daigle MD   Medication Sig Dispense Refill   • Multiple Vitamin (Multivitamin Adult) Tab         Allergies:   ALLERGIES:   Allergen Reactions   • Amoxicillin HIVES     PMH: History reviewed. No pertinent past medical history.  PSH: History reviewed. No pertinent surgical history.  FH: History reviewed. No pertinent family history.  SOCIAL:   School:  Senior in high school  Hobbies / Activities:  Swimming  Future Plans:  College at [employer]  Tobacco: denies  EtOH: denies  Illicit drugs or Homeopathic medicines: denies  Family Hx: negative for athletic cardiac events    REVIEW OF SYSTEMS:  Gen: Feeling well, no fever/chills, recent rapid wt changes.  Derm: No new or changes in existing moles or lesions. No rashes.  Positive for hands sweating  Neuro: No dizziness, no HAs, numbness/tingling.  Resp: No uri sx, no cough, wheeze, SOB.  Nodes: No noted lymphadenopathy, swollen glands.  Cardiac: No chest pain, palpitations, skipped beats. No prior hx of murmur. No hx of syncope.  GI: No emesis, diarrhea, constipation, or blood in stools.  : No polyuria or dysuria, urgency, or frequency.  MS: No joint swelling/warmth, no recent injuries.  Hemat: No easy bruising or bleeding.  Psych: No depressive symptoms.    PHYSICAL EXAM:  Blood pressure 110/60, pulse (!) 45, temperature 98.5 °F (36.9 °C), temperature source Oral, resp. rate 16, height 5' 7\" (1.702 m), weight 67.1 kg (148 lb), SpO2 98 %.  Gen: Well-appearing male, no acute  distress.  Derm: No lesions or rashes noted.  HEENT: PERRL, EOMI, no conjunctival injection. TMs with normal landmarks bilaterally.  Oropharynx with moist mucous membranes, clear.  Neck: Supple, no lymphadenopathy.  Lungs: CTA bilaterally. No wheezes, rales, rhonchi. Normal respiratory effort.  Cardiac: Regular rate and rhythm, normal S1,S2, no murmur appreciated.  Abdomen: Soft, nontender, non-distended. Normoactive bowel sounds. No organomegaly or masses.  Genital: Surendra V male without external lesions. Testes without abnormalities. No hernia.  Extremities: Full ROM UE/LE. Warm, well-perfused. No clubbing/cyanosis/edema.  Back: No scoliosis.  Neuro: Grossly intact. Strength 5/5 bilaterally. Normal tone. Gait non-ataxic, toe/heel walking intact.    ASSESSMENT: Well 17 year old male adolescent.  PLAN:  CBC, CMP, TSH  Forms filled out  All questions answered.   WIAA participation okay.  Immunizations, labs per orders.  RTC in 1-2 years for WCE or sooner prn illness/concerns.    BEHAVIOR/GUIDANCE:      Tobacco, ETOH, drug avoidance      Sexual activity & avoidance / safe sex       Puberty       Accident prevention       School achievement       Family/Peer relationships       Regular physical activity       Healthy food choices    Usman Daigle MD    Statement Selected

## 2023-10-04 ENCOUNTER — APPOINTMENT (OUTPATIENT)
Dept: ULTRASOUND IMAGING | Facility: IMAGING CENTER | Age: 28
End: 2023-10-04

## 2024-01-09 ENCOUNTER — APPOINTMENT (OUTPATIENT)
Dept: PSYCHIATRY | Facility: CLINIC | Age: 29
End: 2024-01-09

## 2024-01-22 ENCOUNTER — APPOINTMENT (OUTPATIENT)
Dept: PSYCHIATRY | Facility: CLINIC | Age: 29
End: 2024-01-22

## 2024-04-11 NOTE — ED PROVIDER NOTE - NEUROLOGICAL, MLM
Alert and oriented, no focal deficits, no motor or sensory deficits. Ruslan Vicente  Thoracic Surgery  [id number] [place] [address], [address] [place] [address]  Phone: [phone]  Fax: [phone]  Follow Up Time:

## 2024-05-31 ENCOUNTER — EMERGENCY (EMERGENCY)
Facility: HOSPITAL | Age: 29
LOS: 0 days | Discharge: HOME | End: 2024-05-31
Attending: STUDENT IN AN ORGANIZED HEALTH CARE EDUCATION/TRAINING PROGRAM
Payer: MEDICAID

## 2024-05-31 VITALS
RESPIRATION RATE: 18 BRPM | OXYGEN SATURATION: 96 % | DIASTOLIC BLOOD PRESSURE: 72 MMHG | HEIGHT: 63 IN | TEMPERATURE: 99 F | SYSTOLIC BLOOD PRESSURE: 109 MMHG | WEIGHT: 166.89 LBS | HEART RATE: 97 BPM

## 2024-05-31 VITALS
RESPIRATION RATE: 18 BRPM | TEMPERATURE: 98 F | DIASTOLIC BLOOD PRESSURE: 70 MMHG | OXYGEN SATURATION: 98 % | SYSTOLIC BLOOD PRESSURE: 116 MMHG | HEART RATE: 90 BPM

## 2024-05-31 DIAGNOSIS — R10.13 EPIGASTRIC PAIN: ICD-10-CM

## 2024-05-31 DIAGNOSIS — R10.11 RIGHT UPPER QUADRANT PAIN: ICD-10-CM

## 2024-05-31 DIAGNOSIS — R11.2 NAUSEA WITH VOMITING, UNSPECIFIED: ICD-10-CM

## 2024-05-31 DIAGNOSIS — K52.9 NONINFECTIVE GASTROENTERITIS AND COLITIS, UNSPECIFIED: ICD-10-CM

## 2024-05-31 LAB
ALBUMIN SERPL ELPH-MCNC: 4 G/DL — SIGNIFICANT CHANGE UP (ref 3.3–5)
ALP SERPL-CCNC: 60 U/L — SIGNIFICANT CHANGE UP (ref 40–120)
ALT FLD-CCNC: 27 U/L — SIGNIFICANT CHANGE UP (ref 12–78)
ANION GAP SERPL CALC-SCNC: 6 MMOL/L — SIGNIFICANT CHANGE UP (ref 5–17)
APPEARANCE UR: ABNORMAL
AST SERPL-CCNC: 23 U/L — SIGNIFICANT CHANGE UP (ref 15–37)
BACTERIA # UR AUTO: NEGATIVE /HPF — SIGNIFICANT CHANGE UP
BASOPHILS # BLD AUTO: 0.02 K/UL — SIGNIFICANT CHANGE UP (ref 0–0.2)
BASOPHILS NFR BLD AUTO: 0.2 % — SIGNIFICANT CHANGE UP (ref 0–2)
BILIRUB SERPL-MCNC: 0.5 MG/DL — SIGNIFICANT CHANGE UP (ref 0.2–1.2)
BILIRUB UR-MCNC: NEGATIVE — SIGNIFICANT CHANGE UP
BUN SERPL-MCNC: 14 MG/DL — SIGNIFICANT CHANGE UP (ref 7–23)
CALCIUM SERPL-MCNC: 8.8 MG/DL — SIGNIFICANT CHANGE UP (ref 8.5–10.1)
CHLORIDE SERPL-SCNC: 110 MMOL/L — HIGH (ref 96–108)
CO2 SERPL-SCNC: 23 MMOL/L — SIGNIFICANT CHANGE UP (ref 22–31)
COLOR SPEC: YELLOW — SIGNIFICANT CHANGE UP
COMMENT - URINE: SIGNIFICANT CHANGE UP
CREAT SERPL-MCNC: 0.77 MG/DL — SIGNIFICANT CHANGE UP (ref 0.5–1.3)
DIFF PNL FLD: ABNORMAL
EGFR: 107 ML/MIN/1.73M2 — SIGNIFICANT CHANGE UP
EOSINOPHIL # BLD AUTO: 0.01 K/UL — SIGNIFICANT CHANGE UP (ref 0–0.5)
EOSINOPHIL NFR BLD AUTO: 0.1 % — SIGNIFICANT CHANGE UP (ref 0–6)
EPI CELLS # UR: SIGNIFICANT CHANGE UP
GLUCOSE SERPL-MCNC: 117 MG/DL — HIGH (ref 70–99)
GLUCOSE UR QL: NEGATIVE MG/DL — SIGNIFICANT CHANGE UP
HCG SERPL-ACNC: <1 MIU/ML — SIGNIFICANT CHANGE UP
HCT VFR BLD CALC: 42.3 % — SIGNIFICANT CHANGE UP (ref 34.5–45)
HGB BLD-MCNC: 13.3 G/DL — SIGNIFICANT CHANGE UP (ref 11.5–15.5)
IMM GRANULOCYTES NFR BLD AUTO: 0.5 % — SIGNIFICANT CHANGE UP (ref 0–0.9)
KETONES UR-MCNC: NEGATIVE MG/DL — SIGNIFICANT CHANGE UP
LACTATE SERPL-SCNC: 1.3 MMOL/L — SIGNIFICANT CHANGE UP (ref 0.7–2)
LEUKOCYTE ESTERASE UR-ACNC: NEGATIVE — SIGNIFICANT CHANGE UP
LYMPHOCYTES # BLD AUTO: 0.42 K/UL — LOW (ref 1–3.3)
LYMPHOCYTES # BLD AUTO: 4 % — LOW (ref 13–44)
MANUAL SMEAR VERIFICATION: SIGNIFICANT CHANGE UP
MCHC RBC-ENTMCNC: 26.3 PG — LOW (ref 27–34)
MCHC RBC-ENTMCNC: 31.4 G/DL — LOW (ref 32–36)
MCV RBC AUTO: 83.8 FL — SIGNIFICANT CHANGE UP (ref 80–100)
MONOCYTES # BLD AUTO: 0.3 K/UL — SIGNIFICANT CHANGE UP (ref 0–0.9)
MONOCYTES NFR BLD AUTO: 2.9 % — SIGNIFICANT CHANGE UP (ref 2–14)
NEUTROPHILS # BLD AUTO: 9.66 K/UL — HIGH (ref 1.8–7.4)
NEUTROPHILS NFR BLD AUTO: 92.3 % — HIGH (ref 43–77)
NITRITE UR-MCNC: NEGATIVE — SIGNIFICANT CHANGE UP
NRBC # BLD: 0 /100 WBCS — SIGNIFICANT CHANGE UP (ref 0–0)
PH UR: 5.5 — SIGNIFICANT CHANGE UP (ref 5–8)
PLAT MORPH BLD: NORMAL — SIGNIFICANT CHANGE UP
PLATELET # BLD AUTO: 346 K/UL — SIGNIFICANT CHANGE UP (ref 150–400)
POTASSIUM SERPL-MCNC: 4 MMOL/L — SIGNIFICANT CHANGE UP (ref 3.5–5.3)
POTASSIUM SERPL-SCNC: 4 MMOL/L — SIGNIFICANT CHANGE UP (ref 3.5–5.3)
PROT SERPL-MCNC: 8.2 GM/DL — SIGNIFICANT CHANGE UP (ref 6–8.3)
PROT UR-MCNC: SIGNIFICANT CHANGE UP MG/DL
RBC # BLD: 5.05 M/UL — SIGNIFICANT CHANGE UP (ref 3.8–5.2)
RBC # FLD: 13.7 % — SIGNIFICANT CHANGE UP (ref 10.3–14.5)
RBC BLD AUTO: NORMAL — SIGNIFICANT CHANGE UP
RBC CASTS # UR COMP ASSIST: 0 /HPF — SIGNIFICANT CHANGE UP (ref 0–4)
SODIUM SERPL-SCNC: 139 MMOL/L — SIGNIFICANT CHANGE UP (ref 135–145)
SP GR SPEC: 1.03 — SIGNIFICANT CHANGE UP (ref 1–1.03)
UROBILINOGEN FLD QL: 0.2 MG/DL — SIGNIFICANT CHANGE UP (ref 0.2–1)
WBC # BLD: 10.46 K/UL — SIGNIFICANT CHANGE UP (ref 3.8–10.5)
WBC # FLD AUTO: 10.46 K/UL — SIGNIFICANT CHANGE UP (ref 3.8–10.5)
WBC UR QL: 0 /HPF — SIGNIFICANT CHANGE UP (ref 0–5)

## 2024-05-31 PROCEDURE — 74177 CT ABD & PELVIS W/CONTRAST: CPT | Mod: 26,MC

## 2024-05-31 PROCEDURE — 99284 EMERGENCY DEPT VISIT MOD MDM: CPT

## 2024-05-31 PROCEDURE — 76700 US EXAM ABDOM COMPLETE: CPT | Mod: 26

## 2024-05-31 RX ORDER — CIPROFLOXACIN LACTATE 400MG/40ML
500 VIAL (ML) INTRAVENOUS ONCE
Refills: 0 | Status: COMPLETED | OUTPATIENT
Start: 2024-05-31 | End: 2024-05-31

## 2024-05-31 RX ORDER — KETOROLAC TROMETHAMINE 30 MG/ML
30 SYRINGE (ML) INJECTION ONCE
Refills: 0 | Status: DISCONTINUED | OUTPATIENT
Start: 2024-05-31 | End: 2024-05-31

## 2024-05-31 RX ORDER — ACETAMINOPHEN 500 MG
1000 TABLET ORAL ONCE
Refills: 0 | Status: COMPLETED | OUTPATIENT
Start: 2024-05-31 | End: 2024-05-31

## 2024-05-31 RX ORDER — CIPROFLOXACIN LACTATE 400MG/40ML
1 VIAL (ML) INTRAVENOUS
Qty: 14 | Refills: 0
Start: 2024-05-31 | End: 2024-06-06

## 2024-05-31 RX ADMIN — Medication 500 MILLIGRAM(S): at 17:54

## 2024-05-31 RX ADMIN — Medication 30 MILLIGRAM(S): at 17:53

## 2024-05-31 RX ADMIN — Medication 400 MILLIGRAM(S): at 13:58

## 2024-05-31 NOTE — OB RN TRIAGE NOTE - COMFORT/ACCEPTABLE PAIN LEVEL (0-10)
Physical Therapy Evaluation    Visit Type: Initial Evaluation  Visit: 1  Referring Provider: Odette Francisco PA-C  Medical Diagnosis (from order): O71.6 - Obstetric damage to pelvic joints and ligaments (CMD)  M25.552 - Pain in left hip  M25.551 - Pain in right hip   Treatment Diagnosis: RLE, LLE, pelvic health - increased pain/symptoms, impaired posture, impaired mobility, impaired strength and impaired bladder health.  Onset  - Date of Surgery:  No surgery found  - Procedure: No surgery found  Chart reviewed at time of initial evaluation (relevant co-morbidities, allergies, tests and medications listed):   No past medical history on file.       SUBJECTIVE                                                                                                               05/31/24: reports pubic symphysis, lake hip, and low back pain post vaginal delivery in 2009 that improved with exercise, however has had a few more pregnancies since and each time her pain returns. Is currently ~14 weeks pregnant, second trimester and states concordant symptoms historically worsen as she grows with pregnancy. Large movements in WB like skipping steps, hip abd are concordant; sleeping on once side for too long is also uncomfortable needs to use pillows and reposition herself often for pain relief. Also reports pain with increases in intra abdominal pressure like sneezing, coughing, laughing, also causing CURTIS symptoms. Reports leakage with urgency. No vaginal pain, or pain with insertion. believes has some sciatica nerve discomfort because gets shooting pain all the way down to foot, and lower back pain.for exercise goes on walks and does stepper for short periods of time. Is in second trimester of pregnancy.     Pain / Symptoms  - Location: Pubic bone/groin, low back, lake hips; feels R is weaker  - Quality / Description: ache, throbbing        Bladder: leakage with activities and leakage on way to restroom  - Alleviating Factors: rest,  avoids aggravating activity, change in position    Function:   Limitations / Exacerbation Factors:   - bed mobility, sleep disturbed,  activities, bending/squatting/lifting, walking, standing and lifting/carrying, positional transitions, coughing/laughing/sneezing and limited ability to be away from restroom  Prior Level of Function: onset following child birth,    Patient Goals: decreased pain, decrease symptoms, increased strength, return to sport/leisure activities, decrease leakage, able to delay toileting and improved sleep.    Prior treatment  - no therapies  - Discharged from hospital, home health, or skilled nursing facility in last 30 days: no  Home Environment   - Patient lives with: family  - Assistance available: consistent  - Denies 2 or more falls or an unexplained fall with injury in the last year.  - Feel safe at home / work / school: yes      OBJECTIVE                                                                                                                     Range of Motion (ROM)   (degrees unless noted; active unless noted; norms in ( ); negative=lacking to 0, positive=beyond 0)  WFL: LLE, RLE    Strength  (out of 5 unless noted, standard test position unless noted)   Hip:    - Flexion:         Left: 4-         Right: 4-    - Abduction:         Left: 4         Right: 4-    - Internal Rotation:         Left: 4         Right: 4-    - External Rotation:         Left: 4         Right: 4-  Lumbar:    - Modified Sahrmann Lower Abdominal: fire transverse abdominus         Special Tests  ASLR (-) lake            Outcome/Assessments  Outcome Measures:   Lower Extremity Functional Scale: LEFS Calculated Total: 73 (0=extreme difficulty; 80=no difficulty) see flowsheet for additional documentation      Treatment     Therapeutic Exercise  See HEP - black TB    Therapeutic Activity  Pt educated on anatomy with use of pelvic model including bony landmarks, pelvic floor musculature, pelvic organs  and how they interrelate with pelvic floor muscles  Education on heat/heating pad for pain relief, safe use       Skilled input: verbal instruction/cues, tactile instruction/cues and facilitation    Writer verbally educated and received verbal consent for hand placement, positioning of patient, and techniques to be performed today from patient for hand placement and palpation for techniques and therapist position for techniques as described above and how they are pertinent to the patient's plan of care.  Home Exercise Program  Access Code: [email or web address]  URL: [email or web address]  Date: 05/31/2024  Prepared by: Paul Grant    Exercises  - Supine Transversus Abdominis Bracing - Hands on Stomach  - 1-2 x daily - 7 x weekly - 30 reps  - Standing Transverse Abdominis Contraction  - 1-2 x daily - 7 x weekly - 30 reps  - Seated Hip Abduction with Resistance  - 1-2 x daily - 7 x weekly - 1-2 sets - 10 reps - 5s hold  - Seated Hip Adduction Squeeze with Ball  - 1-2 x daily - 7 x weekly - 1-2 sets - 10 reps - 5s hold      ASSESSMENT                                                                                                          35 year old patient has reported functional limitations listed above impacted by signs and symptoms consistent with treatment diagnosis below.  Treatment Diagnosis:   - Involved: RLE, LLE, pelvic health.  - Symptoms/impairments: increased pain/symptoms, impaired posture, impaired mobility, impaired strength and impaired bladder health.    Pt presents with pubic pain, hip pain, decreased abdominal and hip strength, decreased stabilization of pelvis post partum and during pregnancy. Pt will benefit from skilled physical therapy focused on core stabilization, pelvic floor muscle training, healthy bowel and bladder habits, breathing education, pain relief. With lack of skilled physical therapy pt is a risk of further decline in function.     Statement of medical necessity:  patient is at risk of decline in mobility pre and post partum as belly continues to expand and further destabilize pubis region    Prognosis: patient will benefit from skilled therapy  Rehabilitative potential is: excellent.  Predicted patient presentation: Low (stable) - Patient comorbidities and complexities, as defined above, will have little effect on progress for prescribed plan of care.  Education:   - Present and ready to learn: patient  - Results of above outlined education: Verbalizes understanding and Demonstrates understanding    PLAN                                                                                                                         The following skilled interventions to be implemented to achieve goals listed below:  Gait Training (40830)  Manual Therapy (79391)  Therapeutic Activity (38194)  Neuromuscular Re-Education (54515)  Therapeutic Exercise (97280)  Heat/Cold (66775)    Frequency / Duration  1 times per week tapering as patient progresses for 12 weeks for an estimated total of 12 visits    Patient involved in and agreed to plan of care and goals.  Patient given attendance policy at time of initial evaluation., Patient offered attendance policy at [facility]. and Attendance policy reviewed with patient.    Suggestions for next session as indicated: Urge suppression education: 4 part step process to reduce urgency/frequency--hand out provided  Knack education: for precontracting pelvic floor muscles prior to stress actions (I.e. coughing/sneezing/lifting)  Constipation education: increase water intake, use of squatty potty/toileting posture  Functional breathing with pelvic floor muscle activation with hand out provided  Core and hip stabilization, isometrics  Diaphragmatic breathing exercises  TA activation exercises         Goals  Long Term Goals: to be met by end of plan of care  1. cough, laugh, sneeze, overcome urgency with 50% reduced incontinence in order to complete ADLs, be  somewhere without a restroom.  2. Patient will report 50% reduction in concordant pain to complete household, , and everyday activities by end of plan of care  3. Demonstrate ability to negotiate level and unlevel surfaces at variable velocities, including change of direction without increased pain or instability to return to age appropriate and community activities at prior level of function by end of plan of care.  4. Lower Extremity Functional Scale: Patient will score 80 or higher on The Lower Extremity Functional Scale to indicate a decreased level of difficulty with walking and moving around. (minimal clinically important difference: 9 points change)      Therapy procedure time and total treatment time can be found documented on the Time Entry flowsheet     9

## 2024-05-31 NOTE — ED PROVIDER NOTE - NSFOLLOWUPINSTRUCTIONS_ED_ALL_ED_FT
[place] diet over the next 48 hours  Tylenol or motrin for pain   Drink plenty of liquids  Take Cipro 500mg twice a day c 7 days   Follow up with her you doctor  Return for any concerns

## 2024-05-31 NOTE — ED PROVIDER NOTE - CLINICAL SUMMARY MEDICAL DECISION MAKING FREE TEXT BOX
abd pain, back pain, nausea and vomiting since 2am, had salmon and vegies cooked at home yeasterday, initially vomited food, now bile, pain in epigastri and ruq, 8/10 29 year old female with no past medical history presents today c/o         abd pain, back pain, nausea and vomiting since 2am, had salmon and vegies cooked at home yeasterday, initially vomited food, now bile, pain in epigastri and ruq, 8/10

## 2024-05-31 NOTE — ED PROVIDER NOTE - PATIENT PORTAL LINK FT
You can access the FollowMyHealth Patient Portal offered by [facility] by registering at the following website: [email or web address]. By joining [employer]’s FollowMyHealth portal, you will also be able to view your health information using other applications (apps) compatible with our system.

## 2024-05-31 NOTE — ED ADULT NURSE NOTE - OBJECTIVE STATEMENT
pt is AOX4 present to the ED c/o c/o diffuse abdominal pain, back pain, N/V/D since 2AM. Denies SOB, chest pain. LMP 5/14/24. . history of ADHD

## 2024-05-31 NOTE — ED ADULT NURSE NOTE - NSFALLUNIVINTERV_ED_ALL_ED
Bed/Stretcher in lowest position, wheels locked, appropriate side rails in place/Call bell, personal items and telephone in reach/Instruct patient to call for assistance before getting out of bed/chair/stretcher/Non-slip footwear applied when patient is off stretcher/[place] to call system/Physically safe environment - no spills, clutter or unnecessary equipment/Purposeful proactive rounding/Room/bathroom lighting operational, light cord in reach

## 2024-06-01 LAB
CULTURE RESULTS: SIGNIFICANT CHANGE UP
SPECIMEN SOURCE: SIGNIFICANT CHANGE UP

## 2024-06-03 ENCOUNTER — APPOINTMENT (OUTPATIENT)
Dept: INTERNAL MEDICINE | Facility: CLINIC | Age: 29
End: 2024-06-03
Payer: MEDICAID

## 2024-06-03 VITALS
WEIGHT: 157 LBS | RESPIRATION RATE: 16 BRPM | SYSTOLIC BLOOD PRESSURE: 126 MMHG | BODY MASS INDEX: 27.82 KG/M2 | OXYGEN SATURATION: 98 % | HEART RATE: 83 BPM | HEIGHT: 63 IN | DIASTOLIC BLOOD PRESSURE: 78 MMHG

## 2024-06-03 DIAGNOSIS — E55.9 VITAMIN D DEFICIENCY, UNSPECIFIED: ICD-10-CM

## 2024-06-03 DIAGNOSIS — R10.13 EPIGASTRIC PAIN: ICD-10-CM

## 2024-06-03 DIAGNOSIS — R79.89 OTHER SPECIFIED ABNORMAL FINDINGS OF BLOOD CHEMISTRY: ICD-10-CM

## 2024-06-03 DIAGNOSIS — M54.50 LOW BACK PAIN, UNSPECIFIED: ICD-10-CM

## 2024-06-03 DIAGNOSIS — F32.A DEPRESSION, UNSPECIFIED: ICD-10-CM

## 2024-06-03 DIAGNOSIS — F98.8 OTHER SPECIFIED BEHAVIORAL AND EMOTIONAL DISORDERS WITH ONSET USUALLY OCCURRING IN CHILDHOOD AND ADOLESCENCE: ICD-10-CM

## 2024-06-03 DIAGNOSIS — Z00.00 ENCOUNTER FOR GENERAL ADULT MEDICAL EXAMINATION W/OUT ABNORMAL FINDINGS: ICD-10-CM

## 2024-06-03 DIAGNOSIS — J31.0 CHRONIC RHINITIS: ICD-10-CM

## 2024-06-03 DIAGNOSIS — Z11.1 ENCOUNTER FOR SCREENING FOR RESPIRATORY TUBERCULOSIS: ICD-10-CM

## 2024-06-03 DIAGNOSIS — R73.09 OTHER ABNORMAL GLUCOSE: ICD-10-CM

## 2024-06-03 PROCEDURE — 99395 PREV VISIT EST AGE 18-39: CPT | Mod: 25

## 2024-06-03 RX ORDER — DEXTROAMPHETAMINE SACCHARATE, AMPHETAMINE ASPARTATE MONOHYDRATE, DEXTROAMPHETAMINE SULFATE AND AMPHETAMINE SULFATE 5; 5; 5; 5 MG/1; MG/1; MG/1; MG/1
20 CAPSULE, EXTENDED RELEASE ORAL
Qty: 30 | Refills: 0 | Status: ACTIVE | COMMUNITY
Start: 2019-09-24 | End: 1900-01-01

## 2024-06-03 NOTE — REVIEW OF SYSTEMS
[Negative] : Heme/Lymph [Itching] : no itching [Skin Rash] : no skin rash [de-identified] : concentration issues

## 2024-06-03 NOTE — HEALTH RISK ASSESSMENT
[No] : In the past 12 months have you used drugs other than those required for medical reasons? No [No falls in past year] : Patient reported no falls in the past year [0] : 2) Feeling down, depressed, or hopeless: Not at all (0) [PHQ-2 Negative - No further assessment needed] : PHQ-2 Negative - No further assessment needed [Never] : Never [None] : None [With Family] : lives with family [Student] : student [Feels Safe at Home] : Feels safe at home [Fully functional (bathing, dressing, toileting, transferring, walking, feeding)] : Fully functional (bathing, dressing, toileting, transferring, walking, feeding) [Fully functional (using the telephone, shopping, preparing meals, housekeeping, doing laundry, using] : Fully functional and needs no help or supervision to perform IADLs (using the telephone, shopping, preparing meals, housekeeping, doing laundry, using transportation, managing medications and managing finances) [Smoke Detector] : smoke detector [Carbon Monoxide Detector] : carbon monoxide detector [Seat Belt] :  uses seat belt [Sunscreen] : uses sunscreen [Audit-CScore] : 0 [BOM0Claub] : 0 [Change in mental status noted] : No change in mental status noted [Reports changes in hearing] : Reports no changes in hearing [Reports changes in vision] : Reports no changes in vision [Reports changes in dental health] : Reports no changes in dental health
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
## 2024-06-06 LAB
25(OH)D3 SERPL-MCNC: 21.8 NG/ML
ALBUMIN SERPL ELPH-MCNC: 4 G/DL
ALP BLD-CCNC: 86 U/L
ALT SERPL-CCNC: 26 U/L
ANION GAP SERPL CALC-SCNC: 12 MMOL/L
AST SERPL-CCNC: 29 U/L
BASOPHILS # BLD AUTO: 0.03 K/UL
BASOPHILS NFR BLD AUTO: 0.4 %
BILIRUB SERPL-MCNC: 0.2 MG/DL
BUN SERPL-MCNC: 14 MG/DL
CALCIUM SERPL-MCNC: 9.1 MG/DL
CHLORIDE SERPL-SCNC: 105 MMOL/L
CHOLEST SERPL-MCNC: 159 MG/DL
CO2 SERPL-SCNC: 22 MMOL/L
CREAT SERPL-MCNC: 0.76 MG/DL
EGFR: 109 ML/MIN/1.73M2
EOSINOPHIL # BLD AUTO: 0.51 K/UL
EOSINOPHIL NFR BLD AUTO: 7.5 %
ESTIMATED AVERAGE GLUCOSE: 117 MG/DL
GLUCOSE SERPL-MCNC: 106 MG/DL
HBA1C MFR BLD HPLC: 5.7 %
HCT VFR BLD CALC: 38.8 %
HDLC SERPL-MCNC: 53 MG/DL
HGB BLD-MCNC: 11.5 G/DL
IMM GRANULOCYTES NFR BLD AUTO: 0.6 %
LDLC SERPL CALC-MCNC: 79 MG/DL
LYMPHOCYTES # BLD AUTO: 2.64 K/UL
LYMPHOCYTES NFR BLD AUTO: 38.8 %
M TB IFN-G BLD-IMP: NEGATIVE
MAN DIFF?: NORMAL
MCHC RBC-ENTMCNC: 26.1 PG
MCHC RBC-ENTMCNC: 29.6 GM/DL
MCV RBC AUTO: 88.2 FL
MONOCYTES # BLD AUTO: 0.42 K/UL
MONOCYTES NFR BLD AUTO: 6.2 %
NEUTROPHILS # BLD AUTO: 3.17 K/UL
NEUTROPHILS NFR BLD AUTO: 46.5 %
NONHDLC SERPL-MCNC: 106 MG/DL
PLATELET # BLD AUTO: 337 K/UL
POTASSIUM SERPL-SCNC: 3.9 MMOL/L
PROT SERPL-MCNC: 6.8 G/DL
QUANTIFERON TB PLUS MITOGEN MINUS NIL: >10 IU/ML
QUANTIFERON TB PLUS NIL: 0.03 IU/ML
QUANTIFERON TB PLUS TB1 MINUS NIL: 0 IU/ML
QUANTIFERON TB PLUS TB2 MINUS NIL: 0 IU/ML
RBC # BLD: 4.4 M/UL
RBC # FLD: 14.5 %
SODIUM SERPL-SCNC: 139 MMOL/L
TRIGL SERPL-MCNC: 162 MG/DL
TSH SERPL-ACNC: 0.93 UIU/ML
WBC # FLD AUTO: 6.81 K/UL

## 2024-08-07 NOTE — ED PROVIDER NOTE - PROGRESS NOTE DETAILS
Pt found to have uti, likely pyelo. Has ovarian cyst as well. No evidence of torsion. Pt started on abx, and is feeling well, and is ready for d/c. Quality 226: Preventive Care And Screening: Tobacco Use: Screening And Cessation Intervention: Patient screened for tobacco use and is an ex/non-smoker Quality 130: Documentation Of Current Medications In The Medical Record: Current Medications Documented Detail Level: Detailed

## 2024-08-20 NOTE — ED PROVIDER NOTE - NSFOLLOWUPINSTRUCTIONS_ED_ALL_ED_FT
Detail Level: Detailed Detail Level: Generalized Take antibiotics as prescribed  Rest, drink plenty of fluids  Advance activity as tolerated  Continue all previously prescribed medications as directed  Follow up with your PMD - bring copies of your results  Return to the ER for fevers, severe pain, persistent symptoms, or other new or concerning symptoms

## 2024-09-30 ENCOUNTER — NON-APPOINTMENT (OUTPATIENT)
Age: 29
End: 2024-09-30

## 2024-12-03 ENCOUNTER — APPOINTMENT (OUTPATIENT)
Dept: INTERNAL MEDICINE | Facility: CLINIC | Age: 29
End: 2024-12-03
Payer: MEDICAID

## 2024-12-03 VITALS
OXYGEN SATURATION: 98 % | DIASTOLIC BLOOD PRESSURE: 80 MMHG | HEIGHT: 63 IN | SYSTOLIC BLOOD PRESSURE: 112 MMHG | BODY MASS INDEX: 26.75 KG/M2 | WEIGHT: 151 LBS | HEART RATE: 95 BPM

## 2024-12-03 DIAGNOSIS — R73.09 OTHER ABNORMAL GLUCOSE: ICD-10-CM

## 2024-12-03 DIAGNOSIS — E55.9 VITAMIN D DEFICIENCY, UNSPECIFIED: ICD-10-CM

## 2024-12-03 DIAGNOSIS — M54.50 LOW BACK PAIN, UNSPECIFIED: ICD-10-CM

## 2024-12-03 DIAGNOSIS — R10.13 EPIGASTRIC PAIN: ICD-10-CM

## 2024-12-03 DIAGNOSIS — J31.0 CHRONIC RHINITIS: ICD-10-CM

## 2024-12-03 DIAGNOSIS — F32.A DEPRESSION, UNSPECIFIED: ICD-10-CM

## 2024-12-03 DIAGNOSIS — E66.3 OVERWEIGHT: ICD-10-CM

## 2024-12-03 DIAGNOSIS — F98.8 OTHER SPECIFIED BEHAVIORAL AND EMOTIONAL DISORDERS WITH ONSET USUALLY OCCURRING IN CHILDHOOD AND ADOLESCENCE: ICD-10-CM

## 2024-12-03 PROCEDURE — 99214 OFFICE O/P EST MOD 30 MIN: CPT | Mod: 25

## 2024-12-04 LAB
25(OH)D3 SERPL-MCNC: 14.7 NG/ML
ALBUMIN SERPL ELPH-MCNC: 4.5 G/DL
ALP BLD-CCNC: 67 U/L
ALT SERPL-CCNC: 24 U/L
ANION GAP SERPL CALC-SCNC: 11 MMOL/L
AST SERPL-CCNC: 20 U/L
BILIRUB SERPL-MCNC: 0.6 MG/DL
BUN SERPL-MCNC: 10 MG/DL
CALCIUM SERPL-MCNC: 9.6 MG/DL
CHLORIDE SERPL-SCNC: 107 MMOL/L
CO2 SERPL-SCNC: 22 MMOL/L
CREAT SERPL-MCNC: 0.74 MG/DL
EGFR: 112 ML/MIN/1.73M2
ESTIMATED AVERAGE GLUCOSE: 111 MG/DL
GLUCOSE SERPL-MCNC: 83 MG/DL
HBA1C MFR BLD HPLC: 5.5 %
POTASSIUM SERPL-SCNC: 4.5 MMOL/L
PROT SERPL-MCNC: 7.3 G/DL
SODIUM SERPL-SCNC: 140 MMOL/L
TSH SERPL-ACNC: 2.39 UIU/ML

## 2025-02-21 RX ORDER — ERGOCALCIFEROL 1.25 MG/1
1.25 MG CAPSULE, LIQUID FILLED ORAL
Qty: 12 | Refills: 0 | Status: ACTIVE | COMMUNITY
Start: 2025-02-21 | End: 1900-01-01

## 2025-03-11 NOTE — ED PROVIDER NOTE - NS ED ROS FT
For information on Fall & Injury Prevention, visit: [email or web address] OR  [email or web address] OR  https://www.cdc.gov/steadi/patient.html ROS: denies HA, weakness, dizziness, fevers/chills, chest pain, SOB, diaphoresis, abdominal pain, joint pain, neuro deficits, dysuria/hematuria, rash    +abdominal cramping, n/v/d

## 2025-06-04 ENCOUNTER — APPOINTMENT (OUTPATIENT)
Dept: INTERNAL MEDICINE | Facility: CLINIC | Age: 30
End: 2025-06-04

## 2025-09-02 ENCOUNTER — APPOINTMENT (OUTPATIENT)
Dept: INTERNAL MEDICINE | Facility: CLINIC | Age: 30
End: 2025-09-02
Payer: MEDICAID

## 2025-09-02 VITALS
SYSTOLIC BLOOD PRESSURE: 110 MMHG | BODY MASS INDEX: 27.11 KG/M2 | WEIGHT: 153 LBS | DIASTOLIC BLOOD PRESSURE: 74 MMHG | HEIGHT: 63 IN | OXYGEN SATURATION: 98 % | HEART RATE: 83 BPM

## 2025-09-02 DIAGNOSIS — E66.3 OVERWEIGHT: ICD-10-CM

## 2025-09-02 DIAGNOSIS — M54.50 LOW BACK PAIN, UNSPECIFIED: ICD-10-CM

## 2025-09-02 DIAGNOSIS — F98.8 OTHER SPECIFIED BEHAVIORAL AND EMOTIONAL DISORDERS WITH ONSET USUALLY OCCURRING IN CHILDHOOD AND ADOLESCENCE: ICD-10-CM

## 2025-09-02 DIAGNOSIS — S46.911A STRAIN OF UNSPECIFIED MUSCLE, FASCIA AND TENDON AT SHOULDER AND UPPER ARM LEVEL, RIGHT ARM, INITIAL ENCOUNTER: ICD-10-CM

## 2025-09-02 DIAGNOSIS — J34.89 OTHER SPECIFIED DISORDERS OF NOSE AND NASAL SINUSES: ICD-10-CM

## 2025-09-02 DIAGNOSIS — Z01.818 ENCOUNTER FOR OTHER PREPROCEDURAL EXAMINATION: ICD-10-CM

## 2025-09-02 DIAGNOSIS — Z02.0 ENCOUNTER FOR EXAMINATION FOR ADMISSION TO EDUCATIONAL INSTITUTION: ICD-10-CM

## 2025-09-02 DIAGNOSIS — S46.912A STRAIN OF UNSPECIFIED MUSCLE, FASCIA AND TENDON AT SHOULDER AND UPPER ARM LEVEL, LEFT ARM, INITIAL ENCOUNTER: ICD-10-CM

## 2025-09-02 DIAGNOSIS — E55.9 VITAMIN D DEFICIENCY, UNSPECIFIED: ICD-10-CM

## 2025-09-02 DIAGNOSIS — J31.0 CHRONIC RHINITIS: ICD-10-CM

## 2025-09-02 DIAGNOSIS — R73.09 OTHER ABNORMAL GLUCOSE: ICD-10-CM

## 2025-09-02 DIAGNOSIS — R79.89 OTHER SPECIFIED ABNORMAL FINDINGS OF BLOOD CHEMISTRY: ICD-10-CM

## 2025-09-02 DIAGNOSIS — Z87.42 PERSONAL HISTORY OF OTHER DISEASES OF THE FEMALE GENITAL TRACT: ICD-10-CM

## 2025-09-02 DIAGNOSIS — Z87.2 PERSONAL HISTORY OF DISEASES OF THE SKIN AND SUBCUTANEOUS TISSUE: ICD-10-CM

## 2025-09-02 DIAGNOSIS — Z23 ENCOUNTER FOR IMMUNIZATION: ICD-10-CM

## 2025-09-02 DIAGNOSIS — Z87.09 PERSONAL HISTORY OF OTHER DISEASES OF THE RESPIRATORY SYSTEM: ICD-10-CM

## 2025-09-02 DIAGNOSIS — R41.840 ATTENTION AND CONCENTRATION DEFICIT: ICD-10-CM

## 2025-09-02 DIAGNOSIS — Z00.00 ENCOUNTER FOR GENERAL ADULT MEDICAL EXAMINATION W/OUT ABNORMAL FINDINGS: ICD-10-CM

## 2025-09-02 DIAGNOSIS — Z87.39 PERSONAL HISTORY OF OTHER DISEASES OF THE MUSCULOSKELETAL SYSTEM AND CONNECTIVE TISSUE: ICD-10-CM

## 2025-09-02 DIAGNOSIS — Z01.84 ENCOUNTER FOR ANTIBODY RESPONSE EXAMINATION: ICD-10-CM

## 2025-09-02 DIAGNOSIS — R10.13 EPIGASTRIC PAIN: ICD-10-CM

## 2025-09-02 DIAGNOSIS — F32.A DEPRESSION, UNSPECIFIED: ICD-10-CM

## 2025-09-02 DIAGNOSIS — Z86.03 PERSONAL HISTORY OF NEOPLASM OF UNCERTAIN BEHAVIOR: ICD-10-CM

## 2025-09-02 DIAGNOSIS — L21.9 SEBORRHEIC DERMATITIS, UNSPECIFIED: ICD-10-CM

## 2025-09-02 DIAGNOSIS — R09.81 NASAL CONGESTION: ICD-10-CM

## 2025-09-02 LAB
25(OH)D3 SERPL-MCNC: 15.8 NG/ML
ALBUMIN SERPL ELPH-MCNC: 4.4 G/DL
ALP BLD-CCNC: 66 U/L
ALT SERPL-CCNC: 20 U/L
ANION GAP SERPL CALC-SCNC: 10 MMOL/L
AST SERPL-CCNC: 21 U/L
BILIRUB SERPL-MCNC: 0.4 MG/DL
BUN SERPL-MCNC: 12 MG/DL
CALCIUM SERPL-MCNC: 9.3 MG/DL
CHLORIDE SERPL-SCNC: 108 MMOL/L
CHOLEST SERPL-MCNC: 168 MG/DL
CO2 SERPL-SCNC: 21 MMOL/L
CREAT SERPL-MCNC: 0.7 MG/DL
EGFRCR SERPLBLD CKD-EPI 2021: 119 ML/MIN/1.73M2
GLUCOSE SERPL-MCNC: 81 MG/DL
HDLC SERPL-MCNC: 56 MG/DL
LDLC SERPL-MCNC: 100 MG/DL
NONHDLC SERPL-MCNC: 111 MG/DL
POTASSIUM SERPL-SCNC: 4.4 MMOL/L
PROT SERPL-MCNC: 7 G/DL
SODIUM SERPL-SCNC: 139 MMOL/L
TRIGL SERPL-MCNC: 57 MG/DL
TSH SERPL-ACNC: 0.76 UIU/ML

## 2025-09-02 PROCEDURE — 99395 PREV VISIT EST AGE 18-39: CPT | Mod: 25

## 2025-09-02 PROCEDURE — 90656 IIV3 VACC NO PRSV 0.5 ML IM: CPT

## 2025-09-02 PROCEDURE — G0008: CPT

## 2025-09-02 RX ORDER — LISDEXAMFETAMINE DIMESYLATE 50 MG/1
50 CAPSULE ORAL
Refills: 0 | Status: ACTIVE | COMMUNITY
Start: 2025-09-02

## 2025-09-03 LAB
BASOPHILS # BLD AUTO: 0.05 K/UL
BASOPHILS NFR BLD AUTO: 0.9 %
EOSINOPHIL # BLD AUTO: 0.4 K/UL
EOSINOPHIL NFR BLD AUTO: 7.4 %
ESTIMATED AVERAGE GLUCOSE: 105 MG/DL
HBA1C MFR BLD HPLC: 5.3 %
HCT VFR BLD CALC: 38.5 %
HGB BLD-MCNC: 11.7 G/DL
IMM GRANULOCYTES NFR BLD AUTO: 0.2 %
LYMPHOCYTES # BLD AUTO: 1.54 K/UL
LYMPHOCYTES NFR BLD AUTO: 28.5 %
MAN DIFF?: NORMAL
MCHC RBC-ENTMCNC: 26.3 PG
MCHC RBC-ENTMCNC: 30.4 G/DL
MCV RBC AUTO: 86.5 FL
MONOCYTES # BLD AUTO: 0.41 K/UL
MONOCYTES NFR BLD AUTO: 7.6 %
NEUTROPHILS # BLD AUTO: 2.99 K/UL
NEUTROPHILS NFR BLD AUTO: 55.4 %
PLATELET # BLD AUTO: 361 K/UL
RBC # BLD: 4.45 M/UL
RBC # FLD: 14 %
WBC # FLD AUTO: 5.4 K/UL

## 2025-09-19 ENCOUNTER — LABORATORY RESULT (OUTPATIENT)
Age: 30
End: 2025-09-19

## 2025-09-19 DIAGNOSIS — Z02.83 ENCOUNTER FOR BLOOD-ALCOHOL AND BLOOD-DRUG TEST: ICD-10-CM

## 2025-09-26 LAB
AMPHET UR-MCNC: NORMAL NG/ML
BARBITURATES UR-MCNC: NEGATIVE NG/ML
BENZODIAZ UR-MCNC: NEGATIVE NG/ML
COCAINE METAB.OTHER UR-MCNC: NEGATIVE NG/ML
CREATININE, URINE: 27.2 MG/DL
FENTANYL, URINE: NEGATIVE NG/ML
METHADONE SCREEN, UR: NEGATIVE NG/ML
OPIATES UR-MCNC: NEGATIVE NG/ML
OXYCODONE/OXYMORPHONE, URINE: NEGATIVE NG/ML
PCP UR-MCNC: NEGATIVE NG/ML
PH, URINE: 5.8
THC UR QL: NEGATIVE NG/ML